# Patient Record
Sex: MALE | URBAN - NONMETROPOLITAN AREA
[De-identification: names, ages, dates, MRNs, and addresses within clinical notes are randomized per-mention and may not be internally consistent; named-entity substitution may affect disease eponyms.]

---

## 2018-10-08 ENCOUNTER — HOSPITAL ENCOUNTER (OUTPATIENT)
Dept: WOUND CARE | Age: 58
Discharge: HOME | End: 2018-10-08
Payer: COMMERCIAL

## 2018-10-08 VITALS
WEIGHT: 285 LBS | DIASTOLIC BLOOD PRESSURE: 80 MMHG | SYSTOLIC BLOOD PRESSURE: 119 MMHG | RESPIRATION RATE: 16 BRPM | HEIGHT: 74 IN | BODY MASS INDEX: 36.57 KG/M2 | HEART RATE: 75 BPM | TEMPERATURE: 96.8 F

## 2018-10-08 DIAGNOSIS — L97.512 RIGHT FOOT ULCER, WITH FAT LAYER EXPOSED (HCC): ICD-10-CM

## 2018-10-08 DIAGNOSIS — L97.412 DIABETIC ULCER OF RIGHT MIDFOOT ASSOCIATED WITH TYPE 2 DIABETES MELLITUS, WITH FAT LAYER EXPOSED (HCC): ICD-10-CM

## 2018-10-08 DIAGNOSIS — E11.621 DIABETIC ULCER OF RIGHT MIDFOOT ASSOCIATED WITH TYPE 2 DIABETES MELLITUS, WITH FAT LAYER EXPOSED (HCC): ICD-10-CM

## 2018-10-08 PROCEDURE — 87186 SC STD MICRODIL/AGAR DIL: CPT

## 2018-10-08 PROCEDURE — 29445 APPL RIGID TOT CNTC LEG CAST: CPT

## 2018-10-08 PROCEDURE — 87071 CULTURE AEROBIC QUANT OTHER: CPT

## 2018-10-08 PROCEDURE — 99203 OFFICE O/P NEW LOW 30 MIN: CPT

## 2018-10-08 PROCEDURE — 87073 CULTURE BACTERIA ANAEROBIC: CPT

## 2018-10-11 ENCOUNTER — HOSPITAL ENCOUNTER (OUTPATIENT)
Dept: WOUND CARE | Age: 58
Discharge: HOME | End: 2018-10-11
Payer: COMMERCIAL

## 2018-10-11 VITALS
TEMPERATURE: 97 F | SYSTOLIC BLOOD PRESSURE: 117 MMHG | HEART RATE: 83 BPM | DIASTOLIC BLOOD PRESSURE: 80 MMHG | RESPIRATION RATE: 16 BRPM

## 2018-10-11 DIAGNOSIS — E11.621 DIABETIC ULCER OF RIGHT MIDFOOT ASSOCIATED WITH TYPE 2 DIABETES MELLITUS, WITH FAT LAYER EXPOSED (HCC): Primary | ICD-10-CM

## 2018-10-11 DIAGNOSIS — L97.512 RIGHT FOOT ULCER, WITH FAT LAYER EXPOSED (HCC): ICD-10-CM

## 2018-10-11 DIAGNOSIS — L97.412 DIABETIC ULCER OF RIGHT MIDFOOT ASSOCIATED WITH TYPE 2 DIABETES MELLITUS, WITH FAT LAYER EXPOSED (HCC): Primary | ICD-10-CM

## 2018-10-11 PROCEDURE — 29445 APPL RIGID TOT CNTC LEG CAST: CPT

## 2018-10-11 NOTE — PROGRESS NOTES
care with: n/a              Your wound-care supplies will be provided by: .kati ROBLEDO provider:              Compression with              Off loading:  Date               Wound Meds:              Wound cleansing:                           Do not scrub or use excessive force. Wash hands with soap and water before and after dressing changes. Prior to applying a clean dressing, cleanse wound with normal saline,                          wound cleanser, or mild soap and water. Ask your physician or nurse before getting the wound(s) wet in the shower. Daily Wound management:                          Keep weight off wounds and reposition every 2 hours. Avoid standing for long periods of time. Apply wraps/stockings in AM and remove at bedtime. Elevate legs to the level of the heart or above for 30 minutes 4-5 times a day and/or when sitting. When taking antibiotics take entire prescription as ordered by MD do not stop taking until medicine is all gone.                                                                 Orders for this week: 10/11/18              . [employer]:       If you have questions or need to reorder your supplies please contact our [employer] representative Nitza at [phone].       Right lateral planter- wash with soap and water daily. Pat dry. Apply damp fibracol to wound bed, cover with silver alginate, fluffed 4x4s, border gauze. TCC to right leg.      Culture obtained 10/8/18     Return on Monday     Follow up with Dr. REYNA [facility]  in the wound care center     Call [phone] for any questions or concerns.   Date__________   Time____________              Treatment Note      Written Patient Dismissal Instructions Given            Electronically signed by Fadi Myles MD on 10/11/2018 at 9:25 AM

## 2018-10-12 LAB
CULTURE: NORMAL
Lab: NORMAL
ORGANISM: NORMAL
REPORT STATUS: NORMAL
SPECIMEN: NORMAL

## 2018-10-15 ENCOUNTER — HOSPITAL ENCOUNTER (OUTPATIENT)
Dept: WOUND CARE | Age: 58
Discharge: HOME | End: 2018-10-15
Payer: COMMERCIAL

## 2018-10-15 VITALS
HEART RATE: 71 BPM | RESPIRATION RATE: 16 BRPM | TEMPERATURE: 98.5 F | SYSTOLIC BLOOD PRESSURE: 136 MMHG | DIASTOLIC BLOOD PRESSURE: 82 MMHG

## 2018-10-15 DIAGNOSIS — L97.412 DIABETIC ULCER OF RIGHT MIDFOOT ASSOCIATED WITH TYPE 2 DIABETES MELLITUS, WITH FAT LAYER EXPOSED (HCC): Primary | ICD-10-CM

## 2018-10-15 DIAGNOSIS — E11.621 DIABETIC ULCER OF RIGHT MIDFOOT ASSOCIATED WITH TYPE 2 DIABETES MELLITUS, WITH FAT LAYER EXPOSED (HCC): Primary | ICD-10-CM

## 2018-10-15 PROCEDURE — 11042 DBRDMT SUBQ TIS 1ST 20SQCM/<: CPT

## 2018-10-15 NOTE — PROGRESS NOTES
is in no acute distress. Mental status:  Patient is appropriate, is  oriented to place and plan of care. Dermatologic exam: Visual inspection of the periwound reveals the skin to be macerated. Wound exam: see wound description below in procedure note      Assessment:     Problem List Items Addressed This Visit     Diabetic ulcer of right midfoot associated with type 2 diabetes mellitus, with fat layer exposed ([address] - Primary        Procedure Note    Indications:  Based on my examination of this patient's wound(s) today, sharp excision into necrotic subcutaneous tissue is required to promote healing and evaluate the extent of previous healing. Performed by: Evelina Carlson MD    Consent obtained: Yes    Time out taken:  Yes    Pain Control: none needed       Debridement:Excisional Debridement    Using #15 blade scalpel the wound(s) was/were sharply debrided down through and including the removal of subcutaneous tissue. Devitalized Tissue Debrided:  biofilm, slough and exudate    Pre Debridement Measurements:  Are located in the Wound Documentation Flow Sheet    All active wounds listed below with today's date are evaluated  Wound(s)    debrided this date include # : 1     Post  Debridement Measurements:  Wound 10/08/18 #1 RT LATERAL FOOT (ONSET 10 WEEKS) DM morejon 2 (Active)   Wound Image   10/15/2018  8:11 AM   Wound Type Wound 10/15/2018  8:11 AM   Wound Diabetic Morejon 2 10/15/2018  8:11 AM   Dressing Status Clean;Dry; Intact 10/8/2018 12:28 PM   Dressing Changed Changed/New 10/8/2018 12:28 PM   Wound Cleansed Wound cleanser 10/15/2018  8:11 AM   Wound Length (cm) 1.8 cm 10/15/2018  8:57 AM   Wound Width (cm) 2.9 cm 10/15/2018  8:57 AM   Wound Depth (cm)  0.1 10/15/2018  8:57 AM   Calculated Wound Size (cm^2) (l*w) 5.22 cm^2 10/15/2018  8:57 AM   Change in Wound Size % (l*w) 10 10/15/2018  8:57 AM   Distance Tunneling (cm) 0 cm 10/15/2018  8:11 AM   Tunneling Position ___ O'Clock 0 10/15/2018                        Consults:   Date                           PCP: Dr. Lilly Paula wound care orders and information:              Residence: home              Continue home health care with: n/a              Your wound-care supplies will be provided by: Vivian ROBLEDO provider:              Compression with              PSV loading:  Date               CTUHO Meds:              BBIBI cleansing:                           ZD not scrub or use excessive force.                          Wash hands with soap and water before and after dressing changes.                         Prior to applying a clean dressing, cleanse wound with normal saline,                          wound cleanser, or mild soap and water.                           Ask your physician or nurse before getting the wound(s) wet in the shower.              Daily Wound management:                          Keep weight off wounds and reposition every 2 hours.                          Avoid standing for long periods of time.                          Apply wraps/stockings in AM and remove at bedtime.                          Elevate legs to the level of the heart or above for 30 minutes 4-5 times a day and/or when sitting.                                               When taking antibiotics take entire prescription as ordered by MD do not stop taking until medicine is all gone.                                                                 Orders for this week: 10/15/18              . [employer]:       If you have questions or need to reorder your supplies please contact our "[employer] representative Nitza at [phone].       Right lateral planter- wash with soap and water daily. Pat dry. Apply silver nitrate moist 4x4's to wound bed,  fluffed 4x4s, conform and tape. Leave in place until Tuesday then apply damp sorbact to wound bed, cover with fibracol, silver alginate. Wrap with conform and tape. Change daily.   Wear walking boot

## 2018-10-18 ENCOUNTER — HOSPITAL ENCOUNTER (OUTPATIENT)
Dept: WOUND CARE | Age: 58
Discharge: HOME | End: 2018-10-18

## 2018-10-22 ENCOUNTER — HOSPITAL ENCOUNTER (OUTPATIENT)
Dept: WOUND CARE | Age: 58
Discharge: HOME | End: 2018-10-22
Payer: COMMERCIAL

## 2018-10-22 VITALS
DIASTOLIC BLOOD PRESSURE: 75 MMHG | HEART RATE: 77 BPM | SYSTOLIC BLOOD PRESSURE: 113 MMHG | TEMPERATURE: 96.5 F | RESPIRATION RATE: 16 BRPM

## 2018-10-22 DIAGNOSIS — L97.512 RIGHT FOOT ULCER, WITH FAT LAYER EXPOSED (HCC): Primary | ICD-10-CM

## 2018-10-22 DIAGNOSIS — E11.621 DIABETIC ULCER OF RIGHT MIDFOOT ASSOCIATED WITH TYPE 2 DIABETES MELLITUS, WITH FAT LAYER EXPOSED (HCC): ICD-10-CM

## 2018-10-22 DIAGNOSIS — L97.412 DIABETIC ULCER OF RIGHT MIDFOOT ASSOCIATED WITH TYPE 2 DIABETES MELLITUS, WITH FAT LAYER EXPOSED (HCC): ICD-10-CM

## 2018-10-22 PROCEDURE — 11042 DBRDMT SUBQ TIS 1ST 20SQCM/<: CPT

## 2018-10-22 ASSESSMENT — PAIN DESCRIPTION - LOCATION: LOCATION: FOOT

## 2018-10-22 ASSESSMENT — PAIN SCALES - GENERAL: PAINLEVEL_OUTOF10: 7

## 2018-10-22 ASSESSMENT — PAIN DESCRIPTION - ORIENTATION: ORIENTATION: RIGHT

## 2018-10-22 ASSESSMENT — PAIN DESCRIPTION - FREQUENCY: FREQUENCY: INTERMITTENT

## 2018-10-22 ASSESSMENT — PAIN DESCRIPTION - DESCRIPTORS: DESCRIPTORS: TENDER

## 2018-10-22 ASSESSMENT — PAIN DESCRIPTION - PROGRESSION: CLINICAL_PROGRESSION: NOT CHANGED

## 2018-10-22 ASSESSMENT — PAIN DESCRIPTION - PAIN TYPE: TYPE: ACUTE PAIN

## 2018-10-22 NOTE — PROGRESS NOTES
Wound Care Center Progress Note with Procedure Note      Ambrocio Last  AGE: 66 y.o. GENDER: male  : 1960  EPISODE DATE:  10/22/2018     Subjective:     Chief Complaint   Patient presents with    Wound Check     right foot         HISTORY of PRESENT ILLNESS      Diana Harmon is a 66 y.o. male who presents today for wound evaluation of Chronic diabetic wound(s) of Rt. foot lateral.  The wound is of moderate severity. The underlying cause of the wound is DM and pressure. Wound Pain Timing/Severity: none  Quality of pain: N/A  Severity of pain:  0 / 10   Modifying Factors: diabetes and chronic pressure  Associated Signs/Symptoms: none        PAST MEDICAL HISTORY        Diagnosis Date    Diabetes mellitus ([address]     Diabetic ulcer of right midfoot associated with type 2 diabetes mellitus, with fat layer exposed ([address] 10/8/2018    Right foot ulcer, with fat layer exposed ([facility] 10/8/2018       PAST SURGICAL HISTORY    Past Surgical History:   Procedure Laterality Date    FRACTURE SURGERY      left toe and right ankle and right knee       FAMILY HISTORY    History reviewed. No pertinent family history. SOCIAL HISTORY    Social History   Substance Use Topics    Smoking status: Never Smoker    Smokeless tobacco: Never Used    Alcohol use No       ALLERGIES    No Known Allergies    MEDICATIONS    No current outpatient prescriptions on file prior to encounter. No current facility-administered medications on file prior to encounter. REVIEW OF SYSTEMS    Pertinent items are noted in HPI. Constitutional: Negative for systemic symptoms including fever, chills and malaise. Objective:      /75   Pulse 77   Temp 96.5 °F (35.8 °C) (Oral)   Resp 16     PHYSICAL EXAM    General: The patient is in no acute distress. Mental status:  Patient is appropriate, is  oriented to place and plan of care.   Dermatologic exam: Visual inspection of the

## 2018-10-29 ENCOUNTER — HOSPITAL ENCOUNTER (OUTPATIENT)
Dept: WOUND CARE | Age: 58
Discharge: HOME | End: 2018-10-29
Payer: COMMERCIAL

## 2018-10-29 VITALS
DIASTOLIC BLOOD PRESSURE: 81 MMHG | RESPIRATION RATE: 16 BRPM | TEMPERATURE: 97.8 F | HEART RATE: 75 BPM | SYSTOLIC BLOOD PRESSURE: 128 MMHG

## 2018-10-29 DIAGNOSIS — L97.412 DIABETIC ULCER OF RIGHT MIDFOOT ASSOCIATED WITH TYPE 2 DIABETES MELLITUS, WITH FAT LAYER EXPOSED (HCC): ICD-10-CM

## 2018-10-29 DIAGNOSIS — L97.512 RIGHT FOOT ULCER, WITH FAT LAYER EXPOSED (HCC): Primary | ICD-10-CM

## 2018-10-29 DIAGNOSIS — E11.621 DIABETIC ULCER OF RIGHT MIDFOOT ASSOCIATED WITH TYPE 2 DIABETES MELLITUS, WITH FAT LAYER EXPOSED (HCC): ICD-10-CM

## 2018-10-29 PROCEDURE — 29445 APPL RIGID TOT CNTC LEG CAST: CPT

## 2018-10-29 PROCEDURE — 11042 DBRDMT SUBQ TIS 1ST 20SQCM/<: CPT | Performed by: NURSE PRACTITIONER

## 2018-10-29 RX ORDER — GABAPENTIN 400 MG/1
800 CAPSULE ORAL DAILY
COMMUNITY

## 2018-10-29 RX ORDER — ENALAPRIL MALEATE 10 MG/1
10 TABLET ORAL DAILY
Status: ON HOLD | COMMUNITY
End: 2019-03-18 | Stop reason: HOSPADM

## 2018-10-29 RX ORDER — SIMVASTATIN 40 MG
40 TABLET ORAL NIGHTLY
COMMUNITY
End: 2019-03-15 | Stop reason: ALTCHOICE

## 2018-10-29 RX ORDER — M-VIT,TX,IRON,MINS/CALC/FOLIC 27MG-0.4MG
1 TABLET ORAL DAILY
COMMUNITY

## 2018-10-29 ASSESSMENT — PAIN DESCRIPTION - FREQUENCY: FREQUENCY: INTERMITTENT

## 2018-10-29 ASSESSMENT — PAIN DESCRIPTION - ORIENTATION: ORIENTATION: RIGHT

## 2018-10-29 ASSESSMENT — PAIN DESCRIPTION - DESCRIPTORS: DESCRIPTORS: TENDER

## 2018-10-29 ASSESSMENT — PAIN DESCRIPTION - PAIN TYPE: TYPE: ACUTE PAIN

## 2018-10-29 ASSESSMENT — PAIN SCALES - GENERAL: PAINLEVEL_OUTOF10: 6

## 2018-10-29 ASSESSMENT — PAIN DESCRIPTION - PROGRESSION: CLINICAL_PROGRESSION: NOT CHANGED

## 2018-10-29 ASSESSMENT — PAIN DESCRIPTION - LOCATION: LOCATION: FOOT

## 2018-10-29 NOTE — PROGRESS NOTES
10/29/2018  8:38 AM   Undermining Ends___ O'Clock 0 10/29/2018  8:38 AM   Undermining Maxium Distance (cm) 0 10/15/2018  8:11 AM   Wound Assessment Red;Pink 10/29/2018  8:38 AM   Drainage Amount Moderate 10/29/2018  8:38 AM   Drainage Description Serosanguinous 10/29/2018  8:38 AM   Odor None 10/29/2018  8:38 AM   Margins Defined edges 10/29/2018  8:38 AM   Cathie-wound Assessment Calloused; Maceration 10/29/2018  8:38 AM   Non-staged Wound Description Full thickness 10/29/2018  8:38 AM   [place]%Wound Bed 20 10/29/2018  8:38 AM   Red%Wound Bed 80 10/29/2018  8:38 AM   Yellow%Wound Bed 0 10/29/2018  8:38 AM   Black%Wound Bed 0 10/29/2018  8:38 AM   Purple%Wound Bed 0 10/29/2018  8:38 AM   Other%Wound Bed 0 10/29/2018  8:38 AM   Debridement per physician Subcutaneous 10/22/2018  9:00 AM   Number of days: 21       Percent of Wound(s) Debrided: approximately 100%    Total  Area  Debrided[de-identified]  4.75 sq cm       Bleeding:  Minimal    Hemostasis Achieved:  by pressure    Procedural Pain:  0  / 10     Post Procedural Pain:  0 / 10     Application of cast:    With the indications of casting being present and no contraindications, information and  instructions were given to the patient and the patient consented to the treatment. After proper wound care and appropriate padding. A total contact cast was applied according to protocol. Fall prevention safety and instructions on using various supportive walking devices were discussed with the patient. The patient was also instructed on what to do and how to get help if the cast became uncomfortable. The patient expressed understanding of all of these issues. The patient tolerated the procedure well. Status of wound progress and description from last visit:   Slightly smaller in size.       Plan:       Discharge Instructions         Plan:         Discharge Instructions        .PHYSICIAN ORDERS AND DISCHARGE INSTRUCTIONS     NOTE: Upon discharge from the [address], you will receive a patient experience survey. We would be grateful if you would take the time to fill this survey out.     Wound care order history:                 VIVI's   Right   0.93    Left 0.93            Date 10/8/18              Vascular studies:   Date               Imaging:   Date               Cultures:   Date               Labs/ HbA1c:   Date               Grafts:  Date               HBO:                Antibiotics: gentamycin              Earlier Wound care treatments:  Gent, salt and vinager soaks              Authorizations:                        Consults:   Date                           PCP: Dr. Amy Hanson wound care orders and information:              Residence: home              Continue home health care with: n/a              Your wound-care supplies will be provided by: Aman Tejada               DME provider:              Compression with              NST loading:  Date               CMBOX Meds:              [facility] cleansing:                           HU not scrub or use excessive force.                          Wash hands with soap and water before and after dressing changes.                         Prior to applying a clean dressing, cleanse wound with normal saline,                          wound cleanser, or mild soap and water.                           Ask your physician or nurse before getting the wound(s) wet in the shower.              Daily Wound management:                          Keep weight off wounds and reposition every 2 hours.                          Avoid standing for long periods of time.                          Apply wraps/stockings in AM and remove at bedtime.                          Elevate legs to the level of the heart or above for 30 minutes 4-5 times a day and/or when sitting.                                               When taking antibiotics take entire prescription as ordered by MD do not stop taking until medicine is all gone.

## 2018-11-01 ENCOUNTER — HOSPITAL ENCOUNTER (OUTPATIENT)
Dept: WOUND CARE | Age: 58
Discharge: HOME | End: 2018-11-01
Payer: COMMERCIAL

## 2018-11-01 VITALS
TEMPERATURE: 97.6 F | SYSTOLIC BLOOD PRESSURE: 131 MMHG | RESPIRATION RATE: 16 BRPM | DIASTOLIC BLOOD PRESSURE: 84 MMHG | HEART RATE: 75 BPM

## 2018-11-01 DIAGNOSIS — L97.412 DIABETIC ULCER OF RIGHT MIDFOOT ASSOCIATED WITH TYPE 2 DIABETES MELLITUS, WITH FAT LAYER EXPOSED (HCC): Primary | ICD-10-CM

## 2018-11-01 DIAGNOSIS — E11.621 DIABETIC ULCER OF RIGHT MIDFOOT ASSOCIATED WITH TYPE 2 DIABETES MELLITUS, WITH FAT LAYER EXPOSED (HCC): Primary | ICD-10-CM

## 2018-11-01 DIAGNOSIS — L97.512 RIGHT FOOT ULCER, WITH FAT LAYER EXPOSED (HCC): ICD-10-CM

## 2018-11-01 PROCEDURE — 87186 SC STD MICRODIL/AGAR DIL: CPT

## 2018-11-01 PROCEDURE — 29445 APPL RIGID TOT CNTC LEG CAST: CPT

## 2018-11-01 PROCEDURE — 11042 DBRDMT SUBQ TIS 1ST 20SQCM/<: CPT

## 2018-11-01 PROCEDURE — 87073 CULTURE BACTERIA ANAEROBIC: CPT

## 2018-11-01 PROCEDURE — 87077 CULTURE AEROBIC IDENTIFY: CPT

## 2018-11-01 PROCEDURE — 87071 CULTURE AEROBIC QUANT OTHER: CPT

## 2018-11-01 NOTE — PROGRESS NOTES
Medication Sig Dispense Refill    metFORMIN (GLUCOPHAGE) 850 MG tablet Take 850 mg by mouth 2 times daily (with meals)      simvastatin (ZOCOR) 40 MG tablet Take 40 mg by mouth nightly      Multiple Vitamins-Minerals (THERAPEUTIC MULTIVITAMIN-MINERALS) tablet Take 1 tablet by mouth daily      gabapentin (NEURONTIN) 400 MG capsule Take 800 mg by mouth every 12 hours as needed. .      enalapril (VASOTEC) 10 MG tablet Take 10 mg by mouth daily       No current facility-administered medications on file prior to encounter. REVIEW OF SYSTEMS    Pertinent items are noted in HPI. Constitutional: Negative for systemic symptoms including fever, chills and malaise. Objective:      /84   Pulse 75   Temp 97.6 °F (36.4 °C) (Temporal)   Resp 16     PHYSICAL EXAM      General: The patient is in no acute distress. Mental status:  Patient is appropriate, is  oriented to place and plan of care. Dermatologic exam: Visual inspection of the periwound reveals the skin to be moist and macerated  Wound exam: see wound description below in procedure note      Assessment:     Problem List Items Addressed This Visit     Right foot ulcer, with fat layer exposed ([address]    Diabetic ulcer of right midfoot associated with type 2 diabetes mellitus, with fat layer exposed ([address] - Primary        Procedure Note    Indications:  Based on my examination of this patient's wound(s) today, sharp excision into necrotic subcutaneous tissue is required to promote healing and evaluate the extent of previous healing. Performed by: Rachel Monterroso MD    Consent obtained: Yes    Time out taken:  Yes    Pain Control: none       Debridement:Excisional Debridement    Using curette the wound(s) was/were sharply debrided down through and including the removal of subcutaneous tissue.         Devitalized Tissue Debrided:  fibrin, biofilm, slough, necrotic/eschar, exudate and callus    Pre Debridement Measurements:  Are located in the Wound Documentation Flow Sheet    All active wounds listed below with today's date are evaluated  Wound(s)    debrided this date include # : 1     Post  Debridement Measurements:  Wound 10/08/18 #1 (onset 10 weeks - DM Wag 2 )Right Lateral Foot  (Active)   Wound Image   11/1/2018  7:56 AM   Wound Type Wound 11/1/2018  7:56 AM   Wound Diabetic Morejon 2 11/1/2018  7:56 AM   Dressing Status Clean;Dry; Intact 11/1/2018  8:43 AM   Dressing Changed Changed/New 11/1/2018  8:43 AM   Dressing/Treatment Alginate; Foam 11/1/2018  8:43 AM   Wound Cleansed Wound cleanser 11/1/2018  7:56 AM   Dressing Change Due 01/06/18 10/22/2018  8:45 AM   Wound Length (cm) 2 cm 11/1/2018  8:35 AM   Wound Width (cm) 2.3 cm 11/1/2018  8:35 AM   Wound Depth (cm)  0.1 11/1/2018  8:35 AM   Calculated Wound Size (cm^2) (l*w) 4.6 cm^2 11/1/2018  8:35 AM   Change in Wound Size % (l*w) 20.69 11/1/2018  8:35 AM   Distance Tunneling (cm) 0 cm 11/1/2018  7:56 AM   Tunneling Position ___ O'Clock 0 11/1/2018  7:56 AM   Undermining Starts ___ O'Clock 0 11/1/2018  7:56 AM   Undermining Ends___ O'Clock 0 11/1/2018  7:56 AM   Undermining Maxium Distance (cm) 0 11/1/2018  7:56 AM   Wound Assessment Pink;Red 11/1/2018  7:56 AM   Drainage Amount Moderate 11/1/2018  7:56 AM   Drainage Description Serosanguinous 11/1/2018  7:56 AM   Odor None 11/1/2018  7:56 AM   Margins Defined edges 11/1/2018  7:56 AM   Cathie-wound Assessment Maceration 11/1/2018  7:56 AM   Non-staged Wound Description Full thickness 11/1/2018  7:56 AM   [place]%Wound Bed 50 11/1/2018  7:56 AM   Red%Wound Bed 50 11/1/2018  7:56 AM   Yellow%Wound Bed 0 11/1/2018  7:56 AM   Black%Wound Bed 0 11/1/2018  7:56 AM   Purple%Wound Bed 0 11/1/2018  7:56 AM   Other%Wound Bed 0 11/1/2018  7:56 AM   Debridement per physician Subcutaneous 10/22/2018  9:00 AM   Number of days: 24       Percent of Wound(s) Debrided: approximately 100%    Total  Area  Debrided:  4.6 sq cm     Bleeding:  Estimated amount of blood loss is 2 ml. Hemostasis Achieved:  by pressure and by silver nitrate stick    Procedural Pain:  0  / 10     Post Procedural Pain:  0 / 10     Response to treatment:  Well tolerated by patient. Status of wound progress and description from last visit:   Wound is marginally better. After we let his foot air dry, the maceration is gone. I apply a layer of betadine to the foot and to the wound. He still MUST have the off-loading, so he needs to have the TCC back on. I place this today. He will get cast change on Monday. Plan:       Discharge Instructions         Discharge Instructions        .PHYSICIAN ORDERS AND DISCHARGE INSTRUCTIONS     NOTE: Upon discharge from the [address], you will receive a patient experience survey. We would be grateful if you would take the time to fill this survey out.     Wound care order history:                 VIVI's   Right   0.93    Left 0.93            Date 10/8/18              Vascular studies:   Date               Imaging:   Date               Cultures:   Date               Labs/ HbA1c:   Date               Grafts:  Date               HBO:                Antibiotics: gentamycin              Earlier Wound care treatments:  Gent, salt and vinager soaks              Authorizations:                        Consults:   Date                           PCP: Dr. Efra Arteaga wound care orders and information:              Residence: home              Continue home health care with: n/a              Your wound-care supplies will be provided by: Massiel ROBLEDO provider:              Compression with              VMC loading:  Date               GYZCZ Meds:              YYPKK cleansing:                           YX not scrub or use excessive force.                          Wash hands with soap and water before and after dressing changes.                           Prior to applying a clean dressing, cleanse wound with normal

## 2018-11-05 ENCOUNTER — HOSPITAL ENCOUNTER (OUTPATIENT)
Dept: WOUND CARE | Age: 58
Discharge: HOME | End: 2018-11-05
Payer: COMMERCIAL

## 2018-11-05 VITALS
DIASTOLIC BLOOD PRESSURE: 75 MMHG | SYSTOLIC BLOOD PRESSURE: 115 MMHG | TEMPERATURE: 98.6 F | RESPIRATION RATE: 16 BRPM | HEART RATE: 74 BPM

## 2018-11-05 DIAGNOSIS — L97.512 RIGHT FOOT ULCER, WITH FAT LAYER EXPOSED (HCC): ICD-10-CM

## 2018-11-05 DIAGNOSIS — L97.412 DIABETIC ULCER OF RIGHT MIDFOOT ASSOCIATED WITH TYPE 2 DIABETES MELLITUS, WITH FAT LAYER EXPOSED (HCC): ICD-10-CM

## 2018-11-05 DIAGNOSIS — E11.621 DIABETIC ULCER OF RIGHT MIDFOOT ASSOCIATED WITH TYPE 2 DIABETES MELLITUS, WITH FAT LAYER EXPOSED (HCC): ICD-10-CM

## 2018-11-05 LAB
CULTURE: NORMAL
Lab: NORMAL
ORGANISM: NORMAL
ORGANISM: NORMAL
REPORT STATUS: NORMAL
SPECIMEN: NORMAL

## 2018-11-05 PROCEDURE — 29445 APPL RIGID TOT CNTC LEG CAST: CPT

## 2018-11-05 RX ORDER — SULFAMETHOXAZOLE AND TRIMETHOPRIM 800; 160 MG/1; MG/1
1 TABLET ORAL 2 TIMES DAILY
Qty: 28 TABLET | Refills: 0 | Status: SHIPPED | OUTPATIENT
Start: 2018-11-05 | End: 2018-11-19

## 2018-11-05 ASSESSMENT — PAIN DESCRIPTION - LOCATION: LOCATION: FOOT

## 2018-11-05 ASSESSMENT — PAIN DESCRIPTION - PAIN TYPE: TYPE: CHRONIC PAIN

## 2018-11-05 ASSESSMENT — PAIN DESCRIPTION - PROGRESSION: CLINICAL_PROGRESSION: NOT CHANGED

## 2018-11-05 ASSESSMENT — PAIN DESCRIPTION - DESCRIPTORS: DESCRIPTORS: TINGLING

## 2018-11-05 ASSESSMENT — PAIN SCALES - GENERAL: PAINLEVEL_OUTOF10: 6

## 2018-11-05 ASSESSMENT — PAIN DESCRIPTION - ONSET: ONSET: ON-GOING

## 2018-11-05 ASSESSMENT — PAIN DESCRIPTION - FREQUENCY: FREQUENCY: INTERMITTENT

## 2018-11-05 ASSESSMENT — PAIN DESCRIPTION - ORIENTATION: ORIENTATION: RIGHT

## 2018-11-05 NOTE — PROGRESS NOTES
Ends___ O'Clock 0 11/5/2018  9:05 AM   Undermining Maxium Distance (cm) 0 11/5/2018  9:05 AM   Wound Assessment [place] 11/5/2018  9:05 AM   Drainage Amount Moderate 11/5/2018  9:05 AM   Drainage Description Serosanguinous 11/5/2018  9:05 AM   Odor None 11/5/2018  9:05 AM   Margins Defined edges; Unattached edges 11/5/2018  9:05 AM   Cathie-wound Assessment Calloused; Maceration;Pink; White 11/5/2018  9:05 AM   Non-staged Wound Description Full thickness 11/5/2018  9:05 AM   [place]%Wound Bed 100 11/5/2018  9:05 AM   Red%Wound Bed 0 11/5/2018  9:05 AM   Yellow%Wound Bed 0 11/5/2018  9:05 AM   Black%Wound Bed 0 11/5/2018  9:05 AM   Purple%Wound Bed 0 11/5/2018  9:05 AM   Other%Wound Bed 0 11/5/2018  9:05 AM   Debridement per physician Subcutaneous 11/5/2018  9:47 AM   Number of days: 28         Application of cast:    With the indications of casting being present and no contraindications, information and  instructions were given to the patient and the patient consented to the treatment. After proper wound care and appropriate padding. A total contact cast was applied according to protocol. Fall prevention safety and instructions on using various supportive walking devices were discussed with the patient. The patient was also instructed on what to do and how to get help if the cast became uncomfortable. The patient expressed understanding of all of these issues. The patient tolerated the procedure well. Status of wound progress and description from last visit:   improved. Plan:       Discharge Instructions         Plan:         Discharge Instructions           Discharge Instructions        .PHYSICIAN ORDERS AND DISCHARGE INSTRUCTIONS     NOTE: Upon discharge from the [address], you will receive a patient experience survey.  We would be grateful if you would take the time to fill this survey out.     Wound care order history:                 VIVI's   Right   0.93    Left 0.93            Date 10/8/18              Vascular studies:   Date               Imaging:   Date               Cultures:   Date               Labs/ HbA1c:   Date               Grafts:  Date               HBO:                Antibiotics: gentamycin              Earlier Wound care treatments:  Gent, salt and vinager soaks              Authorizations:                        Consults:   Date                           PCP: Dr. Vadlez Been wound care orders and information:              Residence: home              Continue home health care with: n/a              Your wound-care supplies will be provided by: Sky Hernandez               DME provider:              Compression with              QEG loading:  Date               NWCGB Meds:              VAGZL cleansing:                           RL not scrub or use excessive force.                          Wash hands with soap and water before and after dressing changes.                           Prior to applying a clean dressing, cleanse wound with normal saline,                          wound cleanser, or mild soap and water.                           Ask your physician or nurse before getting the wound(s) wet in the shower.              Daily Wound management:                          Keep weight off wounds and reposition every 2 hours.                          SYXQJ standing for long periods of time.                          Apply wraps/stockings in AM and remove at bedtime.                          Elevate legs to the level of the heart or above for 30 minutes 4-5 times a day and/or when sitting.                                               When taking antibiotics take entire prescription as ordered by MD do not stop taking until medicine is all gone.                                                                 Orders for this week: 11/5/18              . [employer]:       If you have questions or need to reorder your supplies please contact our [employer] representative Nitza at

## 2018-11-12 ENCOUNTER — HOSPITAL ENCOUNTER (OUTPATIENT)
Dept: WOUND CARE | Age: 58
Discharge: HOME | End: 2018-11-12
Payer: COMMERCIAL

## 2018-11-12 VITALS
DIASTOLIC BLOOD PRESSURE: 84 MMHG | HEART RATE: 83 BPM | TEMPERATURE: 97.7 F | SYSTOLIC BLOOD PRESSURE: 121 MMHG | RESPIRATION RATE: 16 BRPM

## 2018-11-12 DIAGNOSIS — E11.621 DIABETIC ULCER OF RIGHT MIDFOOT ASSOCIATED WITH TYPE 2 DIABETES MELLITUS, WITH FAT LAYER EXPOSED (HCC): ICD-10-CM

## 2018-11-12 DIAGNOSIS — L97.512 RIGHT FOOT ULCER, WITH FAT LAYER EXPOSED (HCC): Primary | ICD-10-CM

## 2018-11-12 DIAGNOSIS — L97.412 DIABETIC ULCER OF RIGHT MIDFOOT ASSOCIATED WITH TYPE 2 DIABETES MELLITUS, WITH FAT LAYER EXPOSED (HCC): ICD-10-CM

## 2018-11-12 PROCEDURE — 11042 DBRDMT SUBQ TIS 1ST 20SQCM/<: CPT

## 2018-11-12 ASSESSMENT — PAIN DESCRIPTION - PROGRESSION: CLINICAL_PROGRESSION: NOT CHANGED

## 2018-11-12 NOTE — PROGRESS NOTES
as needed. .      enalapril (VASOTEC) 10 MG tablet Take 10 mg by mouth daily       No current facility-administered medications on file prior to encounter. REVIEW OF SYSTEMS    Pertinent items are noted in HPI. Constitutional: Negative for systemic symptoms including fever, chills and malaise. Objective:      /84   Pulse 83   Temp 97.7 °F (36.5 °C) (Temporal)   Resp 16     PHYSICAL EXAM    General: The patient is in no acute distress. Mental status:  Patient is appropriate, is  oriented to place and plan of care. Dermatologic exam: Visual inspection of the periwound reveals the skin to be edematous  Wound exam: see wound description below in procedure note      Assessment:     Problem List Items Addressed This Visit     Right foot ulcer, with fat layer exposed ([address] - Primary    Diabetic ulcer of right midfoot associated with type 2 diabetes mellitus, with fat layer exposed ([address]        Procedure Note    Indications:  Based on my examination of this patient's wound(s) today, sharp excision into necrotic subcutaneous tissue is required to promote healing and evaluate the extent of previous healing. Performed by: Diego Kumar DPM    Consent obtained: Yes    Time out taken:  Yes    Pain Control: none       Debridement:Excisional Debridement    Using curette the wound(s) was/were sharply debrided down through and including the removal of subcutaneous tissue. Devitalized Tissue Debrided:  biofilm, slough, exudate and callus    Pre Debridement Measurements:  Are located in the Wound Documentation Flow Sheet    All active wounds listed below with today's date are evaluated  Wound(s)    debrided this date include # : 1     Post  Debridement Measurements:  Wound 10/08/18 #1 (onset 10 weeks - DM Wag 2 )Right Lateral Foot  (Active)   Wound Image   11/1/2018  7:56 AM   Wound Type Wound 11/12/2018  9:00 AM   Wound Diabetic Morejon 2 11/12/2018  9:00 AM   Dressing Status Clean;Dry; Intact

## 2018-11-19 ENCOUNTER — HOSPITAL ENCOUNTER (OUTPATIENT)
Dept: WOUND CARE | Age: 58
Discharge: HOME | End: 2018-11-19
Payer: COMMERCIAL

## 2018-11-19 VITALS
RESPIRATION RATE: 18 BRPM | DIASTOLIC BLOOD PRESSURE: 77 MMHG | SYSTOLIC BLOOD PRESSURE: 139 MMHG | TEMPERATURE: 98.2 F | HEART RATE: 98 BPM

## 2018-11-19 DIAGNOSIS — L97.412 DIABETIC ULCER OF RIGHT MIDFOOT ASSOCIATED WITH TYPE 2 DIABETES MELLITUS, WITH FAT LAYER EXPOSED (HCC): ICD-10-CM

## 2018-11-19 DIAGNOSIS — E11.621 DIABETIC ULCER OF RIGHT MIDFOOT ASSOCIATED WITH TYPE 2 DIABETES MELLITUS, WITH FAT LAYER EXPOSED (HCC): ICD-10-CM

## 2018-11-19 DIAGNOSIS — L97.512 RIGHT FOOT ULCER, WITH FAT LAYER EXPOSED (HCC): Primary | ICD-10-CM

## 2018-11-19 PROCEDURE — 11042 DBRDMT SUBQ TIS 1ST 20SQCM/<: CPT

## 2018-11-19 NOTE — PROGRESS NOTES
discharge from the [address] [facility],[address], you will receive a patient experience survey. We would be grateful if you would take the time to fill this survey out.     Wound care order history:                 VIVI's   Right   0.93    Left 0.93            Date 10/8/18              Vascular studies:   Date               Imaging:   Date               Cultures:   Date               Labs/ HbA1c:   Date               Grafts:  Date               HBO:                Antibiotics: gentamycin. 10/8 bactrim               Earlier Wound care treatments:  Gent, salt and vinager soaks              Authorizations:                        Consults:   Date                           PCP: Dr. Ariana Hoffmann care orders and information:              Residence: home              Continue home health care with: n/a              Your wound-care supplies will be provided by: Doug ROBLEDO provider:              Compression with              BZF loading:  Date               QNUEZ Meds:              RGCDU cleansing:                           FS not scrub or use excessive force.                          Wash hands with soap and water before and after dressing changes.                           Prior to applying a clean dressing, cleanse wound with normal saline,                          wound cleanser, or mild soap and water.                           Ask your physician or nurse before getting the wound(s) wet in the shower.              Daily Wound management:                          Keep weight off wounds and reposition every 2 hours.                          Avoid standing for long periods of time.                          Apply wraps/stockings in AM and remove at bedtime.                          Elevate legs to the level of the heart or above for 30 minutes 4-5 times a day and/or when sitting.                                               When taking antibiotics take entire prescription as ordered by MD do not stop taking until

## 2018-11-26 ENCOUNTER — HOSPITAL ENCOUNTER (OUTPATIENT)
Dept: WOUND CARE | Age: 58
Discharge: HOME | End: 2018-11-26
Payer: COMMERCIAL

## 2018-11-26 VITALS
SYSTOLIC BLOOD PRESSURE: 132 MMHG | RESPIRATION RATE: 16 BRPM | HEART RATE: 76 BPM | TEMPERATURE: 98 F | DIASTOLIC BLOOD PRESSURE: 81 MMHG

## 2018-11-26 DIAGNOSIS — L97.412 DIABETIC ULCER OF RIGHT MIDFOOT ASSOCIATED WITH TYPE 2 DIABETES MELLITUS, WITH FAT LAYER EXPOSED (HCC): ICD-10-CM

## 2018-11-26 DIAGNOSIS — L97.512 RIGHT FOOT ULCER, WITH FAT LAYER EXPOSED (HCC): ICD-10-CM

## 2018-11-26 DIAGNOSIS — E11.621 DIABETIC ULCER OF RIGHT MIDFOOT ASSOCIATED WITH TYPE 2 DIABETES MELLITUS, WITH FAT LAYER EXPOSED (HCC): ICD-10-CM

## 2018-11-26 PROCEDURE — 11042 DBRDMT SUBQ TIS 1ST 20SQCM/<: CPT

## 2018-11-26 NOTE — PROGRESS NOTES
prior to encounter. REVIEW OF SYSTEMS    Pertinent items are noted in HPI. Constitutional: Negative for systemic symptoms including fever, chills and malaise. Objective:      /81   Pulse 76   Temp 98 °F (36.7 °C) (Oral)   Resp 16     PHYSICAL EXAM    General: The patient is in no acute distress. Mental status:  Patient is appropriate, is  oriented to place and plan of care. Dermatologic exam: Visual inspection of the periwound reveals the skin to be normal in turgor and texture  Wound exam: see wound description below in procedure note      Assessment:     Problem List Items Addressed This Visit     Right foot ulcer, with fat layer exposed ([address]    Diabetic ulcer of right midfoot associated with type 2 diabetes mellitus, with fat layer exposed ([address]        Procedure Note    Indications:  Based on my examination of this patient's wound(s) today, sharp excision into necrotic subcutaneous tissue is required to promote healing and evaluate the extent of previous healing. Performed by: Nash Lopez DPM    Consent obtained: Yes    Time out taken:  Yes    Pain Control: none       Debridement:Excisional Debridement    Using #15 blade scalpel the wound(s) was/were sharply debrided down through and including the removal of subcutaneous tissue. Devitalized Tissue Debrided:  biofilm, slough, exudate and callus    Pre Debridement Measurements:  Are located in the Wound Documentation Flow Sheet    All active wounds listed below with today's date are evaluated  Wound(s)    debrided this date include # : 1     Post  Debridement Measurements:  Wound 10/08/18 #1 (onset 10 weeks - DM Wag 2)Right Lateral Foot  (Active)   Wound Image   11/1/2018  7:56 AM   Wound Type Wound 11/19/2018  8:23 AM   Wound Diabetic Morejon 2 11/19/2018  8:23 AM   Dressing Status Clean;Dry; Intact 11/19/2018  8:59 AM   Dressing Changed Changed/New 11/19/2018  8:59 AM   Dressing/Treatment [place];4x4 11/19/2018  8:59 AM

## 2018-12-03 ENCOUNTER — HOSPITAL ENCOUNTER (OUTPATIENT)
Dept: WOUND CARE | Age: 58
Discharge: HOME | End: 2018-12-03
Payer: COMMERCIAL

## 2018-12-03 VITALS
RESPIRATION RATE: 16 BRPM | TEMPERATURE: 98.4 F | DIASTOLIC BLOOD PRESSURE: 73 MMHG | HEART RATE: 78 BPM | SYSTOLIC BLOOD PRESSURE: 121 MMHG

## 2018-12-03 DIAGNOSIS — L97.512 RIGHT FOOT ULCER, WITH FAT LAYER EXPOSED (HCC): Primary | ICD-10-CM

## 2018-12-03 DIAGNOSIS — E11.621 DIABETIC ULCER OF RIGHT MIDFOOT ASSOCIATED WITH TYPE 2 DIABETES MELLITUS, WITH FAT LAYER EXPOSED (HCC): ICD-10-CM

## 2018-12-03 DIAGNOSIS — L97.412 DIABETIC ULCER OF RIGHT MIDFOOT ASSOCIATED WITH TYPE 2 DIABETES MELLITUS, WITH FAT LAYER EXPOSED (HCC): ICD-10-CM

## 2018-12-03 PROCEDURE — 11042 DBRDMT SUBQ TIS 1ST 20SQCM/<: CPT

## 2018-12-03 ASSESSMENT — PAIN DESCRIPTION - FREQUENCY: FREQUENCY: INTERMITTENT

## 2018-12-03 ASSESSMENT — PAIN SCALES - GENERAL: PAINLEVEL_OUTOF10: 6

## 2018-12-03 ASSESSMENT — PAIN DESCRIPTION - DESCRIPTORS: DESCRIPTORS: SORE;TINGLING

## 2018-12-03 ASSESSMENT — PAIN DESCRIPTION - ONSET: ONSET: ON-GOING

## 2018-12-03 ASSESSMENT — PAIN DESCRIPTION - PAIN TYPE: TYPE: CHRONIC PAIN

## 2018-12-03 ASSESSMENT — PAIN DESCRIPTION - ORIENTATION: ORIENTATION: RIGHT

## 2018-12-03 ASSESSMENT — PAIN DESCRIPTION - PROGRESSION: CLINICAL_PROGRESSION: NOT CHANGED

## 2018-12-03 ASSESSMENT — PAIN DESCRIPTION - LOCATION: LOCATION: FOOT

## 2018-12-10 ENCOUNTER — HOSPITAL ENCOUNTER (OUTPATIENT)
Dept: WOUND CARE | Age: 58
Discharge: HOME | End: 2018-12-10

## 2018-12-17 ENCOUNTER — HOSPITAL ENCOUNTER (OUTPATIENT)
Dept: WOUND CARE | Age: 58
Discharge: HOME | End: 2018-12-17
Payer: COMMERCIAL

## 2018-12-17 VITALS — SYSTOLIC BLOOD PRESSURE: 146 MMHG | DIASTOLIC BLOOD PRESSURE: 73 MMHG | TEMPERATURE: 97.8 F | RESPIRATION RATE: 18 BRPM

## 2018-12-17 DIAGNOSIS — L97.512 RIGHT FOOT ULCER, WITH FAT LAYER EXPOSED (HCC): Primary | ICD-10-CM

## 2018-12-17 DIAGNOSIS — E11.621 DIABETIC ULCER OF RIGHT MIDFOOT ASSOCIATED WITH TYPE 2 DIABETES MELLITUS, WITH FAT LAYER EXPOSED (HCC): ICD-10-CM

## 2018-12-17 DIAGNOSIS — L97.412 DIABETIC ULCER OF RIGHT MIDFOOT ASSOCIATED WITH TYPE 2 DIABETES MELLITUS, WITH FAT LAYER EXPOSED (HCC): ICD-10-CM

## 2018-12-17 PROCEDURE — 11042 DBRDMT SUBQ TIS 1ST 20SQCM/<: CPT

## 2018-12-17 ASSESSMENT — PAIN DESCRIPTION - PROGRESSION: CLINICAL_PROGRESSION: NOT CHANGED

## 2018-12-17 NOTE — PROGRESS NOTES
Cleansed Wound cleanser 12/17/2018  8:13 AM   Wound Length (cm) 0.7 cm 12/17/2018  8:13 AM   Wound Width (cm) 1.6 cm 12/17/2018  8:13 AM   Wound Depth (cm) 0.2 cm 12/17/2018  8:13 AM   Wound Surface Area (cm^2) 1.12 cm^2 12/17/2018  8:13 AM   Change in Wound Size % (l*w) 34.12 12/17/2018  8:13 AM   Wound Volume (cm^3) 0.22 cm^3 12/17/2018  8:13 AM   Wound Healing % -29 12/17/2018  8:13 AM   Post-Procedure Length (cm) 0.7 cm 12/17/2018  8:15 AM   Post-Procedure Width (cm) 1.6 cm 12/17/2018  8:15 AM   Post-Procedure Depth (cm) 0.2 cm 12/17/2018  8:15 AM   Post-Procedure Surface Area (cm^2) 1.12 cm^2 12/17/2018  8:15 AM   Post-Procedure Volume (cm^3) 0.22 cm^3 12/17/2018  8:15 AM   Distance Tunneling (cm) 0 cm 12/17/2018  8:13 AM   Tunneling Position ___ O'Clock 0 12/17/2018  8:13 AM   Undermining Starts ___ O'Clock 0 12/17/2018  8:13 AM   Undermining Ends___ O'Clock 0 12/17/2018  8:13 AM   Undermining Maxium Distance (cm) 0 12/17/2018  8:13 AM   Wound Assessment Pink 12/17/2018  8:13 AM   Drainage Amount Moderate 12/17/2018  8:13 AM   Drainage Description Serosanguinous 12/17/2018  8:13 AM   Odor None 12/17/2018  8:13 AM   Margins Defined edges 12/17/2018  8:13 AM   Cathie-wound Assessment Calloused 12/17/2018  8:13 AM   Non-staged Wound Description Full thickness 12/17/2018  8:13 AM   [place]%Wound Bed 100 12/17/2018  8:13 AM   Red%Wound Bed 0 12/17/2018  8:13 AM   Yellow%Wound Bed 0 12/17/2018  8:13 AM   Black%Wound Bed 0 12/17/2018  8:13 AM   Purple%Wound Bed 0 12/17/2018  8:13 AM   Other%Wound Bed 0 12/17/2018  8:13 AM   Number of days: 70       Percent of Wound(s) Debrided: approximately 100%    Total  Area  Debrided:  1.12 sq cm     Bleeding:  Minimal    Hemostasis Achieved:  by pressure    Procedural Pain:  0  / 10     Post Procedural Pain:  0 / 10     Response to treatment:  Well tolerated by patient. Status of wound progress and description from last visit:   Improved slightly.       Plan:       Discharge

## 2018-12-31 ENCOUNTER — HOSPITAL ENCOUNTER (OUTPATIENT)
Dept: WOUND CARE | Age: 58
Discharge: HOME | End: 2018-12-31

## 2019-01-07 ENCOUNTER — HOSPITAL ENCOUNTER (OUTPATIENT)
Dept: WOUND CARE | Age: 59
Discharge: HOME | End: 2019-01-07
Payer: COMMERCIAL

## 2019-01-07 VITALS
TEMPERATURE: 97.2 F | RESPIRATION RATE: 18 BRPM | DIASTOLIC BLOOD PRESSURE: 83 MMHG | HEART RATE: 98 BPM | SYSTOLIC BLOOD PRESSURE: 141 MMHG

## 2019-01-07 DIAGNOSIS — L97.512 RIGHT FOOT ULCER, WITH FAT LAYER EXPOSED (HCC): Primary | ICD-10-CM

## 2019-01-07 DIAGNOSIS — E11.621 DIABETIC ULCER OF RIGHT MIDFOOT ASSOCIATED WITH TYPE 2 DIABETES MELLITUS, WITH FAT LAYER EXPOSED (HCC): ICD-10-CM

## 2019-01-07 DIAGNOSIS — L97.412 DIABETIC ULCER OF RIGHT MIDFOOT ASSOCIATED WITH TYPE 2 DIABETES MELLITUS, WITH FAT LAYER EXPOSED (HCC): ICD-10-CM

## 2019-01-07 PROCEDURE — 11042 DBRDMT SUBQ TIS 1ST 20SQCM/<: CPT

## 2019-01-07 ASSESSMENT — PAIN DESCRIPTION - PROGRESSION: CLINICAL_PROGRESSION: NOT CHANGED

## 2019-01-14 ENCOUNTER — HOSPITAL ENCOUNTER (OUTPATIENT)
Dept: WOUND CARE | Age: 59
Discharge: HOME | End: 2019-01-14
Payer: COMMERCIAL

## 2019-01-14 VITALS
DIASTOLIC BLOOD PRESSURE: 79 MMHG | TEMPERATURE: 97.6 F | HEART RATE: 86 BPM | SYSTOLIC BLOOD PRESSURE: 133 MMHG | RESPIRATION RATE: 16 BRPM

## 2019-01-14 DIAGNOSIS — L97.512 RIGHT FOOT ULCER, WITH FAT LAYER EXPOSED (HCC): ICD-10-CM

## 2019-01-14 DIAGNOSIS — L97.412 DIABETIC ULCER OF RIGHT MIDFOOT ASSOCIATED WITH TYPE 2 DIABETES MELLITUS, WITH FAT LAYER EXPOSED (HCC): ICD-10-CM

## 2019-01-14 DIAGNOSIS — E11.621 DIABETIC ULCER OF RIGHT MIDFOOT ASSOCIATED WITH TYPE 2 DIABETES MELLITUS, WITH FAT LAYER EXPOSED (HCC): ICD-10-CM

## 2019-01-14 PROCEDURE — 11042 DBRDMT SUBQ TIS 1ST 20SQCM/<: CPT

## 2019-01-14 ASSESSMENT — PAIN SCALES - GENERAL: PAINLEVEL_OUTOF10: 5

## 2019-01-14 ASSESSMENT — PAIN DESCRIPTION - FREQUENCY: FREQUENCY: INTERMITTENT

## 2019-01-14 ASSESSMENT — PAIN DESCRIPTION - PROGRESSION: CLINICAL_PROGRESSION: NOT CHANGED

## 2019-01-14 ASSESSMENT — PAIN DESCRIPTION - ONSET: ONSET: ON-GOING

## 2019-01-14 ASSESSMENT — PAIN DESCRIPTION - LOCATION: LOCATION: FOOT

## 2019-01-14 ASSESSMENT — PAIN DESCRIPTION - ORIENTATION: ORIENTATION: RIGHT

## 2019-01-14 ASSESSMENT — PAIN DESCRIPTION - PAIN TYPE: TYPE: CHRONIC PAIN

## 2019-01-14 ASSESSMENT — PAIN - FUNCTIONAL ASSESSMENT: PAIN_FUNCTIONAL_ASSESSMENT: ACTIVITIES ARE NOT PREVENTED

## 2019-01-28 ENCOUNTER — HOSPITAL ENCOUNTER (OUTPATIENT)
Dept: WOUND CARE | Age: 59
Discharge: HOME | End: 2019-01-28

## 2019-02-04 ENCOUNTER — HOSPITAL ENCOUNTER (OUTPATIENT)
Dept: WOUND CARE | Age: 59
Discharge: HOME | End: 2019-02-04
Payer: COMMERCIAL

## 2019-02-04 VITALS — RESPIRATION RATE: 16 BRPM | HEART RATE: 80 BPM | SYSTOLIC BLOOD PRESSURE: 137 MMHG | DIASTOLIC BLOOD PRESSURE: 88 MMHG

## 2019-02-04 DIAGNOSIS — E11.621 DIABETIC ULCER OF RIGHT MIDFOOT ASSOCIATED WITH TYPE 2 DIABETES MELLITUS, WITH FAT LAYER EXPOSED (HCC): ICD-10-CM

## 2019-02-04 DIAGNOSIS — L97.512 RIGHT FOOT ULCER, WITH FAT LAYER EXPOSED (HCC): Primary | ICD-10-CM

## 2019-02-04 DIAGNOSIS — L97.412 DIABETIC ULCER OF RIGHT MIDFOOT ASSOCIATED WITH TYPE 2 DIABETES MELLITUS, WITH FAT LAYER EXPOSED (HCC): ICD-10-CM

## 2019-02-04 PROCEDURE — 29445 APPL RIGID TOT CNTC LEG CAST: CPT

## 2019-02-04 ASSESSMENT — PAIN DESCRIPTION - PROGRESSION: CLINICAL_PROGRESSION: NOT CHANGED

## 2019-02-04 ASSESSMENT — PAIN DESCRIPTION - ONSET: ONSET: ON-GOING

## 2019-02-04 ASSESSMENT — PAIN DESCRIPTION - ORIENTATION: ORIENTATION: RIGHT

## 2019-02-04 ASSESSMENT — PAIN DESCRIPTION - FREQUENCY: FREQUENCY: INTERMITTENT

## 2019-02-04 ASSESSMENT — PAIN DESCRIPTION - PAIN TYPE: TYPE: CHRONIC PAIN

## 2019-02-04 ASSESSMENT — PAIN SCALES - GENERAL: PAINLEVEL_OUTOF10: 4

## 2019-02-04 ASSESSMENT — PAIN DESCRIPTION - LOCATION: LOCATION: FOOT

## 2019-02-04 ASSESSMENT — PAIN DESCRIPTION - DESCRIPTORS: DESCRIPTORS: TINGLING

## 2019-02-07 ENCOUNTER — HOSPITAL ENCOUNTER (OUTPATIENT)
Dept: WOUND CARE | Age: 59
Discharge: HOME | End: 2019-02-07
Payer: COMMERCIAL

## 2019-02-07 VITALS
DIASTOLIC BLOOD PRESSURE: 93 MMHG | RESPIRATION RATE: 16 BRPM | TEMPERATURE: 97.1 F | HEART RATE: 78 BPM | SYSTOLIC BLOOD PRESSURE: 131 MMHG

## 2019-02-07 DIAGNOSIS — L97.512 RIGHT FOOT ULCER, WITH FAT LAYER EXPOSED (HCC): Primary | ICD-10-CM

## 2019-02-07 DIAGNOSIS — E11.621 DIABETIC ULCER OF RIGHT MIDFOOT ASSOCIATED WITH TYPE 2 DIABETES MELLITUS, WITH FAT LAYER EXPOSED (HCC): ICD-10-CM

## 2019-02-07 DIAGNOSIS — L97.412 DIABETIC ULCER OF RIGHT MIDFOOT ASSOCIATED WITH TYPE 2 DIABETES MELLITUS, WITH FAT LAYER EXPOSED (HCC): ICD-10-CM

## 2019-02-07 PROCEDURE — 29445 APPL RIGID TOT CNTC LEG CAST: CPT

## 2019-02-07 ASSESSMENT — PAIN DESCRIPTION - ORIENTATION: ORIENTATION: RIGHT

## 2019-02-07 ASSESSMENT — PAIN SCALES - GENERAL: PAINLEVEL_OUTOF10: 3

## 2019-02-07 ASSESSMENT — PAIN DESCRIPTION - FREQUENCY: FREQUENCY: INTERMITTENT

## 2019-02-07 ASSESSMENT — PAIN DESCRIPTION - LOCATION: LOCATION: FOOT

## 2019-02-07 ASSESSMENT — PAIN DESCRIPTION - PROGRESSION: CLINICAL_PROGRESSION: NOT CHANGED

## 2019-02-07 ASSESSMENT — PAIN DESCRIPTION - PAIN TYPE: TYPE: CHRONIC PAIN

## 2019-02-07 ASSESSMENT — PAIN DESCRIPTION - ONSET: ONSET: ON-GOING

## 2019-02-07 ASSESSMENT — PAIN DESCRIPTION - DESCRIPTORS: DESCRIPTORS: THROBBING

## 2019-02-11 ENCOUNTER — HOSPITAL ENCOUNTER (OUTPATIENT)
Dept: WOUND CARE | Age: 59
Discharge: HOME | End: 2019-02-11
Payer: COMMERCIAL

## 2019-02-11 VITALS
RESPIRATION RATE: 16 BRPM | DIASTOLIC BLOOD PRESSURE: 74 MMHG | SYSTOLIC BLOOD PRESSURE: 112 MMHG | HEART RATE: 81 BPM | TEMPERATURE: 97.9 F

## 2019-02-11 DIAGNOSIS — E11.621 DIABETIC ULCER OF RIGHT MIDFOOT ASSOCIATED WITH TYPE 2 DIABETES MELLITUS, WITH FAT LAYER EXPOSED (HCC): ICD-10-CM

## 2019-02-11 DIAGNOSIS — L97.512 RIGHT FOOT ULCER, WITH FAT LAYER EXPOSED (HCC): Primary | ICD-10-CM

## 2019-02-11 DIAGNOSIS — L97.412 DIABETIC ULCER OF RIGHT MIDFOOT ASSOCIATED WITH TYPE 2 DIABETES MELLITUS, WITH FAT LAYER EXPOSED (HCC): ICD-10-CM

## 2019-02-11 PROCEDURE — 11042 DBRDMT SUBQ TIS 1ST 20SQCM/<: CPT

## 2019-02-11 PROCEDURE — 29445 APPL RIGID TOT CNTC LEG CAST: CPT

## 2019-02-18 ENCOUNTER — HOSPITAL ENCOUNTER (OUTPATIENT)
Dept: WOUND CARE | Age: 59
Discharge: HOME | End: 2019-02-18
Payer: COMMERCIAL

## 2019-02-18 VITALS
HEART RATE: 92 BPM | SYSTOLIC BLOOD PRESSURE: 116 MMHG | RESPIRATION RATE: 16 BRPM | TEMPERATURE: 98.1 F | DIASTOLIC BLOOD PRESSURE: 75 MMHG

## 2019-02-18 DIAGNOSIS — L97.512 RIGHT FOOT ULCER, WITH FAT LAYER EXPOSED (HCC): ICD-10-CM

## 2019-02-18 DIAGNOSIS — E11.621 DIABETIC ULCER OF RIGHT MIDFOOT ASSOCIATED WITH TYPE 2 DIABETES MELLITUS, WITH FAT LAYER EXPOSED (HCC): ICD-10-CM

## 2019-02-18 DIAGNOSIS — L97.412 DIABETIC ULCER OF RIGHT MIDFOOT ASSOCIATED WITH TYPE 2 DIABETES MELLITUS, WITH FAT LAYER EXPOSED (HCC): ICD-10-CM

## 2019-02-18 PROCEDURE — 87073 CULTURE BACTERIA ANAEROBIC: CPT

## 2019-02-18 PROCEDURE — 87071 CULTURE AEROBIC QUANT OTHER: CPT

## 2019-02-18 PROCEDURE — 87077 CULTURE AEROBIC IDENTIFY: CPT

## 2019-02-18 PROCEDURE — 99213 OFFICE O/P EST LOW 20 MIN: CPT

## 2019-02-18 PROCEDURE — 87186 SC STD MICRODIL/AGAR DIL: CPT

## 2019-02-18 RX ORDER — DOXYCYCLINE HYCLATE 100 MG
100 TABLET ORAL 2 TIMES DAILY
Qty: 14 TABLET | Refills: 0 | Status: SHIPPED | OUTPATIENT
Start: 2019-02-18 | End: 2019-02-25

## 2019-02-18 ASSESSMENT — PAIN DESCRIPTION - LOCATION: LOCATION: LEG

## 2019-02-18 ASSESSMENT — PAIN - FUNCTIONAL ASSESSMENT: PAIN_FUNCTIONAL_ASSESSMENT: PREVENTS OR INTERFERES SOME ACTIVE ACTIVITIES AND ADLS

## 2019-02-18 ASSESSMENT — PAIN DESCRIPTION - DESCRIPTORS: DESCRIPTORS: TENDER

## 2019-02-18 ASSESSMENT — PAIN DESCRIPTION - ONSET: ONSET: ON-GOING

## 2019-02-18 ASSESSMENT — PAIN DESCRIPTION - FREQUENCY: FREQUENCY: INTERMITTENT

## 2019-02-18 ASSESSMENT — PAIN DESCRIPTION - ORIENTATION: ORIENTATION: RIGHT

## 2019-02-18 ASSESSMENT — PAIN DESCRIPTION - PAIN TYPE: TYPE: CHRONIC PAIN

## 2019-02-18 ASSESSMENT — PAIN DESCRIPTION - PROGRESSION: CLINICAL_PROGRESSION: NOT CHANGED

## 2019-02-18 ASSESSMENT — PAIN SCALES - GENERAL: PAINLEVEL_OUTOF10: 6

## 2019-02-23 LAB
CULTURE: NORMAL
Lab: NORMAL
ORGANISM: NORMAL
REPORT STATUS: NORMAL
SPECIMEN: NORMAL

## 2019-02-25 ENCOUNTER — HOSPITAL ENCOUNTER (OUTPATIENT)
Dept: WOUND CARE | Age: 59
Discharge: HOME | End: 2019-02-25
Payer: COMMERCIAL

## 2019-02-25 VITALS
RESPIRATION RATE: 16 BRPM | DIASTOLIC BLOOD PRESSURE: 82 MMHG | TEMPERATURE: 95.6 F | SYSTOLIC BLOOD PRESSURE: 135 MMHG | HEART RATE: 80 BPM

## 2019-02-25 DIAGNOSIS — E11.621 DIABETIC ULCER OF RIGHT MIDFOOT ASSOCIATED WITH TYPE 2 DIABETES MELLITUS, WITH FAT LAYER EXPOSED (HCC): ICD-10-CM

## 2019-02-25 DIAGNOSIS — L97.412 DIABETIC ULCER OF RIGHT MIDFOOT ASSOCIATED WITH TYPE 2 DIABETES MELLITUS, WITH FAT LAYER EXPOSED (HCC): ICD-10-CM

## 2019-02-25 DIAGNOSIS — L97.512 RIGHT FOOT ULCER, WITH FAT LAYER EXPOSED (HCC): Primary | ICD-10-CM

## 2019-02-25 PROCEDURE — 11042 DBRDMT SUBQ TIS 1ST 20SQCM/<: CPT

## 2019-02-25 RX ORDER — CIPROFLOXACIN 500 MG/1
500 TABLET, FILM COATED ORAL 2 TIMES DAILY
COMMUNITY
Start: 2019-02-25 | End: 2019-03-08

## 2019-02-25 ASSESSMENT — PAIN DESCRIPTION - PROGRESSION: CLINICAL_PROGRESSION: NOT CHANGED

## 2019-03-04 ENCOUNTER — HOSPITAL ENCOUNTER (OUTPATIENT)
Dept: WOUND CARE | Age: 59
Discharge: HOME | End: 2019-03-04
Payer: COMMERCIAL

## 2019-03-04 VITALS
RESPIRATION RATE: 16 BRPM | SYSTOLIC BLOOD PRESSURE: 123 MMHG | TEMPERATURE: 96.6 F | HEART RATE: 80 BPM | DIASTOLIC BLOOD PRESSURE: 76 MMHG

## 2019-03-04 DIAGNOSIS — L97.512 RIGHT FOOT ULCER, WITH FAT LAYER EXPOSED (HCC): ICD-10-CM

## 2019-03-04 DIAGNOSIS — E11.621 DIABETIC ULCER OF RIGHT MIDFOOT ASSOCIATED WITH TYPE 2 DIABETES MELLITUS, WITH FAT LAYER EXPOSED (HCC): ICD-10-CM

## 2019-03-04 DIAGNOSIS — L97.412 DIABETIC ULCER OF RIGHT MIDFOOT ASSOCIATED WITH TYPE 2 DIABETES MELLITUS, WITH FAT LAYER EXPOSED (HCC): ICD-10-CM

## 2019-03-04 PROCEDURE — 11042 DBRDMT SUBQ TIS 1ST 20SQCM/<: CPT

## 2019-03-04 ASSESSMENT — PAIN DESCRIPTION - DESCRIPTORS: DESCRIPTORS: ITCHING

## 2019-03-04 ASSESSMENT — PAIN DESCRIPTION - PAIN TYPE: TYPE: CHRONIC PAIN

## 2019-03-04 ASSESSMENT — PAIN DESCRIPTION - LOCATION: LOCATION: FOOT

## 2019-03-04 ASSESSMENT — PAIN DESCRIPTION - PROGRESSION: CLINICAL_PROGRESSION: NOT CHANGED

## 2019-03-04 ASSESSMENT — PAIN DESCRIPTION - ORIENTATION: ORIENTATION: RIGHT

## 2019-03-04 ASSESSMENT — PAIN SCALES - GENERAL: PAINLEVEL_OUTOF10: 5

## 2019-03-04 ASSESSMENT — PAIN DESCRIPTION - FREQUENCY: FREQUENCY: INTERMITTENT

## 2019-03-04 ASSESSMENT — PAIN - FUNCTIONAL ASSESSMENT: PAIN_FUNCTIONAL_ASSESSMENT: PREVENTS OR INTERFERES SOME ACTIVE ACTIVITIES AND ADLS

## 2019-03-04 ASSESSMENT — PAIN DESCRIPTION - ONSET: ONSET: ON-GOING

## 2019-03-11 ENCOUNTER — HOSPITAL ENCOUNTER (OUTPATIENT)
Dept: WOUND CARE | Age: 59
Discharge: HOME | End: 2019-03-11
Payer: COMMERCIAL

## 2019-03-11 ENCOUNTER — HOSPITAL ENCOUNTER (OUTPATIENT)
Dept: GENERAL RADIOLOGY | Age: 59
Discharge: HOME | End: 2019-03-11
Payer: COMMERCIAL

## 2019-03-11 ENCOUNTER — HOSPITAL ENCOUNTER (OUTPATIENT)
Age: 59
Discharge: HOME | End: 2019-03-11
Payer: COMMERCIAL

## 2019-03-11 VITALS
SYSTOLIC BLOOD PRESSURE: 134 MMHG | HEART RATE: 98 BPM | TEMPERATURE: 98.6 F | DIASTOLIC BLOOD PRESSURE: 96 MMHG | RESPIRATION RATE: 16 BRPM

## 2019-03-11 DIAGNOSIS — M86.9 DIABETIC FOOT ULCER WITH OSTEOMYELITIS (HCC): ICD-10-CM

## 2019-03-11 DIAGNOSIS — E11.69 DIABETIC FOOT ULCER WITH OSTEOMYELITIS (HCC): ICD-10-CM

## 2019-03-11 DIAGNOSIS — L97.412 DIABETIC ULCER OF RIGHT MIDFOOT ASSOCIATED WITH TYPE 2 DIABETES MELLITUS, WITH FAT LAYER EXPOSED (HCC): ICD-10-CM

## 2019-03-11 DIAGNOSIS — L97.509 DIABETIC FOOT ULCER WITH OSTEOMYELITIS (HCC): ICD-10-CM

## 2019-03-11 DIAGNOSIS — L97.412 ULCER OF RIGHT HEEL, WITH FAT LAYER EXPOSED (HCC): ICD-10-CM

## 2019-03-11 DIAGNOSIS — E11.621 DIABETIC FOOT ULCER WITH OSTEOMYELITIS (HCC): ICD-10-CM

## 2019-03-11 DIAGNOSIS — E11.621 DIABETIC ULCER OF RIGHT MIDFOOT ASSOCIATED WITH TYPE 2 DIABETES MELLITUS, WITH FAT LAYER EXPOSED (HCC): ICD-10-CM

## 2019-03-11 DIAGNOSIS — L97.512 RIGHT FOOT ULCER, WITH FAT LAYER EXPOSED (HCC): Primary | ICD-10-CM

## 2019-03-11 PROCEDURE — 87073 CULTURE BACTERIA ANAEROBIC: CPT

## 2019-03-11 PROCEDURE — 73630 X-RAY EXAM OF FOOT: CPT

## 2019-03-11 PROCEDURE — 87071 CULTURE AEROBIC QUANT OTHER: CPT

## 2019-03-11 PROCEDURE — 87186 SC STD MICRODIL/AGAR DIL: CPT

## 2019-03-11 PROCEDURE — 87077 CULTURE AEROBIC IDENTIFY: CPT

## 2019-03-11 PROCEDURE — 11042 DBRDMT SUBQ TIS 1ST 20SQCM/<: CPT

## 2019-03-11 ASSESSMENT — PAIN DESCRIPTION - PAIN TYPE: TYPE: CHRONIC PAIN

## 2019-03-11 ASSESSMENT — PAIN SCALES - GENERAL: PAINLEVEL_OUTOF10: 8

## 2019-03-11 ASSESSMENT — PAIN DESCRIPTION - ONSET: ONSET: ON-GOING

## 2019-03-11 ASSESSMENT — PAIN DESCRIPTION - LOCATION: LOCATION: FOOT

## 2019-03-11 ASSESSMENT — PAIN DESCRIPTION - DESCRIPTORS: DESCRIPTORS: DISCOMFORT

## 2019-03-11 ASSESSMENT — PAIN DESCRIPTION - FREQUENCY: FREQUENCY: CONTINUOUS

## 2019-03-11 ASSESSMENT — PAIN DESCRIPTION - ORIENTATION: ORIENTATION: RIGHT

## 2019-03-14 LAB
CULTURE: ABNORMAL
CULTURE: ABNORMAL
Lab: ABNORMAL
SPECIMEN: ABNORMAL

## 2019-03-15 ENCOUNTER — HOSPITAL ENCOUNTER (INPATIENT)
Age: 59
LOS: 3 days | Discharge: HOME | DRG: 623 | End: 2019-03-18
Attending: EMERGENCY MEDICINE | Admitting: FAMILY MEDICINE
Payer: COMMERCIAL

## 2019-03-15 ENCOUNTER — APPOINTMENT (OUTPATIENT)
Dept: GENERAL RADIOLOGY | Age: 59
DRG: 623 | End: 2019-03-15
Payer: COMMERCIAL

## 2019-03-15 ENCOUNTER — APPOINTMENT (OUTPATIENT)
Dept: MRI IMAGING | Age: 59
DRG: 623 | End: 2019-03-15
Payer: COMMERCIAL

## 2019-03-15 ENCOUNTER — APPOINTMENT (OUTPATIENT)
Dept: ULTRASOUND IMAGING | Age: 59
DRG: 623 | End: 2019-03-15
Payer: COMMERCIAL

## 2019-03-15 DIAGNOSIS — S60.311A INFECTED ABRASION OF RIGHT THUMB, INITIAL ENCOUNTER: ICD-10-CM

## 2019-03-15 DIAGNOSIS — L08.9 RIGHT FOOT INFECTION: ICD-10-CM

## 2019-03-15 DIAGNOSIS — L97.512 RIGHT FOOT ULCER, WITH FAT LAYER EXPOSED (HCC): ICD-10-CM

## 2019-03-15 DIAGNOSIS — L08.9 INFECTED ABRASION OF RIGHT THUMB, INITIAL ENCOUNTER: ICD-10-CM

## 2019-03-15 DIAGNOSIS — L08.9 FOOT INFECTION: Primary | ICD-10-CM

## 2019-03-15 PROBLEM — E11.59 TYPE 2 DIABETES MELLITUS WITH CIRCULATORY DISORDER, WITHOUT LONG-TERM CURRENT USE OF INSULIN (HCC): Status: ACTIVE | Noted: 2019-03-15

## 2019-03-15 PROBLEM — I10 ESSENTIAL HYPERTENSION: Chronic | Status: ACTIVE | Noted: 2019-03-15

## 2019-03-15 LAB
ALBUMIN SERPL-MCNC: 4.1 GM/DL (ref 3.4–5)
ALP BLD-CCNC: 66 IU/L (ref 40–129)
ALT SERPL-CCNC: 37 U/L (ref 10–40)
ANION GAP SERPL CALCULATED.3IONS-SCNC: 10 MMOL/L (ref 4–16)
AST SERPL-CCNC: 29 IU/L (ref 15–37)
BASOPHILS ABSOLUTE: 0 K/CU MM
BASOPHILS RELATIVE PERCENT: 0.3 % (ref 0–1)
BILIRUB SERPL-MCNC: 0.7 MG/DL (ref 0–1)
BUN BLDV-MCNC: 15 MG/DL (ref 6–23)
CALCIUM SERPL-MCNC: 9.5 MG/DL (ref 8.3–10.6)
CHLORIDE BLD-SCNC: 94 MMOL/L (ref 99–110)
CO2: 31 MMOL/L (ref 21–32)
CREAT SERPL-MCNC: 1.1 MG/DL (ref 0.9–1.3)
DIFFERENTIAL TYPE: ABNORMAL
EOSINOPHILS ABSOLUTE: 0.1 K/CU MM
EOSINOPHILS RELATIVE PERCENT: 0.8 % (ref 0–3)
ERYTHROCYTE SEDIMENTATION RATE: 89 MM/HR (ref 0–20)
GFR AFRICAN AMERICAN: >60 ML/MIN/1.73M2
GFR NON-AFRICAN AMERICAN: >60 ML/MIN/1.73M2
GLUCOSE BLD-MCNC: 211 MG/DL (ref 70–99)
GLUCOSE BLD-MCNC: 217 MG/DL (ref 70–99)
HCT VFR BLD CALC: 40.8 % (ref 42–52)
HEMOGLOBIN: 13.6 GM/DL (ref 13.5–18)
HIGH SENSITIVE C-REACTIVE PROTEIN: 112.1 MG/L
IMMATURE NEUTROPHIL %: 0.2 % (ref 0–0.43)
LYMPHOCYTES ABSOLUTE: 2.3 K/CU MM
LYMPHOCYTES RELATIVE PERCENT: 19.1 % (ref 24–44)
MCH RBC QN AUTO: 29.3 PG (ref 27–31)
MCHC RBC AUTO-ENTMCNC: 33.3 % (ref 32–36)
MCV RBC AUTO: 87.9 FL (ref 78–100)
MONOCYTES ABSOLUTE: 1.2 K/CU MM
MONOCYTES RELATIVE PERCENT: 9.8 % (ref 0–4)
PDW BLD-RTO: 12.5 % (ref 11.7–14.9)
PLATELET # BLD: 194 K/CU MM (ref 140–440)
PMV BLD AUTO: 10.4 FL (ref 7.5–11.1)
POTASSIUM SERPL-SCNC: 4.3 MMOL/L (ref 3.5–5.1)
RBC # BLD: 4.64 M/CU MM (ref 4.6–6.2)
SEGMENTED NEUTROPHILS ABSOLUTE COUNT: 8.3 K/CU MM
SEGMENTED NEUTROPHILS RELATIVE PERCENT: 69.8 % (ref 36–66)
SODIUM BLD-SCNC: 135 MMOL/L (ref 135–145)
TOTAL IMMATURE NEUTOROPHIL: 0.02 K/CU MM
TOTAL PROTEIN: 7.6 GM/DL (ref 6.4–8.2)
WBC # BLD: 11.9 K/CU MM (ref 4–10.5)

## 2019-03-15 PROCEDURE — 85652 RBC SED RATE AUTOMATED: CPT

## 2019-03-15 PROCEDURE — 6370000000 HC RX 637 (ALT 250 FOR IP): Performed by: NURSE PRACTITIONER

## 2019-03-15 PROCEDURE — 86141 C-REACTIVE PROTEIN HS: CPT

## 2019-03-15 PROCEDURE — 6360000004 HC RX CONTRAST MEDICATION: Performed by: FAMILY MEDICINE

## 2019-03-15 PROCEDURE — 93925 LOWER EXTREMITY STUDY: CPT

## 2019-03-15 PROCEDURE — A9579 GAD-BASE MR CONTRAST NOS,1ML: HCPCS | Performed by: FAMILY MEDICINE

## 2019-03-15 PROCEDURE — 2140000000 HC CCU INTERMEDIATE R&B

## 2019-03-15 PROCEDURE — 82962 GLUCOSE BLOOD TEST: CPT

## 2019-03-15 PROCEDURE — 2500000003 HC RX 250 WO HCPCS: Performed by: FAMILY MEDICINE

## 2019-03-15 PROCEDURE — 2580000003 HC RX 258: Performed by: FAMILY MEDICINE

## 2019-03-15 PROCEDURE — 0JBQ0ZZ EXCISION OF RIGHT FOOT SUBCUTANEOUS TISSUE AND FASCIA, OPEN APPROACH: ICD-10-PCS | Performed by: PODIATRIST

## 2019-03-15 PROCEDURE — 2500000003 HC RX 250 WO HCPCS: Performed by: EMERGENCY MEDICINE

## 2019-03-15 PROCEDURE — 83036 HEMOGLOBIN GLYCOSYLATED A1C: CPT

## 2019-03-15 PROCEDURE — 80053 COMPREHEN METABOLIC PANEL: CPT

## 2019-03-15 PROCEDURE — 93971 EXTREMITY STUDY: CPT

## 2019-03-15 PROCEDURE — 99284 EMERGENCY DEPT VISIT MOD MDM: CPT

## 2019-03-15 PROCEDURE — 73630 X-RAY EXAM OF FOOT: CPT

## 2019-03-15 PROCEDURE — 85025 COMPLETE CBC W/AUTO DIFF WBC: CPT

## 2019-03-15 PROCEDURE — 73723 MRI JOINT LWR EXTR W/O&W/DYE: CPT

## 2019-03-15 PROCEDURE — 6360000002 HC RX W HCPCS: Performed by: FAMILY MEDICINE

## 2019-03-15 PROCEDURE — 4500000028 HC INTERMEDIATE PROCEDURE

## 2019-03-15 PROCEDURE — 6370000000 HC RX 637 (ALT 250 FOR IP): Performed by: FAMILY MEDICINE

## 2019-03-15 RX ORDER — SODIUM CHLORIDE 0.9 % (FLUSH) 0.9 %
10 SYRINGE (ML) INJECTION EVERY 12 HOURS SCHEDULED
Status: DISCONTINUED | OUTPATIENT
Start: 2019-03-15 | End: 2019-03-18 | Stop reason: HOSPADM

## 2019-03-15 RX ORDER — ICOSAPENT ETHYL 1000 MG/1
1 CAPSULE ORAL 3 TIMES DAILY
Status: DISCONTINUED | OUTPATIENT
Start: 2019-03-15 | End: 2019-03-18 | Stop reason: HOSPADM

## 2019-03-15 RX ORDER — LISINOPRIL 10 MG/1
10 TABLET ORAL DAILY
Status: DISCONTINUED | OUTPATIENT
Start: 2019-03-16 | End: 2019-03-18 | Stop reason: HOSPADM

## 2019-03-15 RX ORDER — M-VIT,TX,IRON,MINS/CALC/FOLIC 27MG-0.4MG
1 TABLET ORAL DAILY
Status: DISCONTINUED | OUTPATIENT
Start: 2019-03-16 | End: 2019-03-18 | Stop reason: HOSPADM

## 2019-03-15 RX ORDER — ICOSAPENT ETHYL 1000 MG/1
CAPSULE ORAL
COMMUNITY
Start: 2019-02-07

## 2019-03-15 RX ORDER — LIDOCAINE HYDROCHLORIDE 20 MG/ML
5 INJECTION, SOLUTION INFILTRATION; PERINEURAL ONCE
Status: COMPLETED | OUTPATIENT
Start: 2019-03-15 | End: 2019-03-15

## 2019-03-15 RX ORDER — SIMVASTATIN 10 MG
10 TABLET ORAL DAILY
Status: DISCONTINUED | OUTPATIENT
Start: 2019-03-16 | End: 2019-03-18 | Stop reason: HOSPADM

## 2019-03-15 RX ORDER — ONDANSETRON 2 MG/ML
4 INJECTION INTRAMUSCULAR; INTRAVENOUS EVERY 6 HOURS PRN
Status: DISCONTINUED | OUTPATIENT
Start: 2019-03-15 | End: 2019-03-18 | Stop reason: HOSPADM

## 2019-03-15 RX ORDER — GABAPENTIN 300 MG/1
CAPSULE ORAL
COMMUNITY
Start: 2019-02-07 | End: 2019-03-15 | Stop reason: ALTCHOICE

## 2019-03-15 RX ORDER — CLINDAMYCIN PHOSPHATE 600 MG/50ML
600 INJECTION INTRAVENOUS ONCE
Status: COMPLETED | OUTPATIENT
Start: 2019-03-15 | End: 2019-03-15

## 2019-03-15 RX ORDER — VANCOMYCIN HYDROCHLORIDE 1 G/200ML
1000 INJECTION, SOLUTION INTRAVENOUS ONCE
Status: COMPLETED | OUTPATIENT
Start: 2019-03-15 | End: 2019-03-16

## 2019-03-15 RX ORDER — LISINOPRIL 10 MG/1
TABLET ORAL
COMMUNITY
Start: 2019-03-05

## 2019-03-15 RX ORDER — GABAPENTIN 400 MG/1
800 CAPSULE ORAL NIGHTLY
Status: DISCONTINUED | OUTPATIENT
Start: 2019-03-15 | End: 2019-03-18 | Stop reason: HOSPADM

## 2019-03-15 RX ORDER — SIMVASTATIN 40 MG
40 TABLET ORAL
COMMUNITY

## 2019-03-15 RX ORDER — NICOTINE POLACRILEX 4 MG
15 LOZENGE BUCCAL PRN
Status: DISCONTINUED | OUTPATIENT
Start: 2019-03-15 | End: 2019-03-18 | Stop reason: HOSPADM

## 2019-03-15 RX ORDER — DEXTROSE MONOHYDRATE 25 G/50ML
12.5 INJECTION, SOLUTION INTRAVENOUS PRN
Status: DISCONTINUED | OUTPATIENT
Start: 2019-03-15 | End: 2019-03-18 | Stop reason: HOSPADM

## 2019-03-15 RX ORDER — SODIUM CHLORIDE 0.9 % (FLUSH) 0.9 %
10 SYRINGE (ML) INJECTION PRN
Status: DISCONTINUED | OUTPATIENT
Start: 2019-03-15 | End: 2019-03-15

## 2019-03-15 RX ORDER — VANCOMYCIN HYDROCHLORIDE 1 G/200ML
1000 INJECTION, SOLUTION INTRAVENOUS ONCE
Status: COMPLETED | OUTPATIENT
Start: 2019-03-15 | End: 2019-03-15

## 2019-03-15 RX ORDER — SIMVASTATIN 10 MG
10 TABLET ORAL NIGHTLY
Status: DISCONTINUED | OUTPATIENT
Start: 2019-03-15 | End: 2019-03-15

## 2019-03-15 RX ORDER — OXYCODONE HYDROCHLORIDE AND ACETAMINOPHEN 5; 325 MG/1; MG/1
1 TABLET ORAL EVERY 4 HOURS PRN
Status: DISCONTINUED | OUTPATIENT
Start: 2019-03-15 | End: 2019-03-18 | Stop reason: HOSPADM

## 2019-03-15 RX ORDER — MAGNESIUM HYDROXIDE 1200 MG/15ML
LIQUID ORAL
Status: DISPENSED
Start: 2019-03-15 | End: 2019-03-16

## 2019-03-15 RX ORDER — SODIUM CHLORIDE 0.9 % (FLUSH) 0.9 %
10 SYRINGE (ML) INJECTION PRN
Status: DISCONTINUED | OUTPATIENT
Start: 2019-03-15 | End: 2019-03-18 | Stop reason: HOSPADM

## 2019-03-15 RX ORDER — DEXTROSE MONOHYDRATE 50 MG/ML
100 INJECTION, SOLUTION INTRAVENOUS PRN
Status: DISCONTINUED | OUTPATIENT
Start: 2019-03-15 | End: 2019-03-18 | Stop reason: HOSPADM

## 2019-03-15 RX ADMIN — SODIUM CHLORIDE, PRESERVATIVE FREE 10 ML: 5 INJECTION INTRAVENOUS at 21:54

## 2019-03-15 RX ADMIN — TAZOBACTAM SODIUM AND PIPERACILLIN SODIUM 3.38 G: 375; 3 INJECTION, SOLUTION INTRAVENOUS at 21:53

## 2019-03-15 RX ADMIN — LIDOCAINE HYDROCHLORIDE 5 ML: 20 INJECTION, SOLUTION INFILTRATION; PERINEURAL at 15:36

## 2019-03-15 RX ADMIN — CLINDAMYCIN IN 5 PERCENT DEXTROSE 600 MG: 12 INJECTION, SOLUTION INTRAVENOUS at 16:37

## 2019-03-15 RX ADMIN — VANCOMYCIN HYDROCHLORIDE 1000 MG: 1 INJECTION, SOLUTION INTRAVENOUS at 22:20

## 2019-03-15 RX ADMIN — OXYCODONE AND ACETAMINOPHEN 1 TABLET: 5; 325 TABLET ORAL at 23:18

## 2019-03-15 RX ADMIN — ICOSAPENT ETHYL 1 CAPSULE: 1000 CAPSULE ORAL at 22:44

## 2019-03-15 RX ADMIN — SIMVASTATIN 10 MG: 10 TABLET, FILM COATED ORAL at 22:43

## 2019-03-15 RX ADMIN — GADOTERIDOL 20 ML: 279.3 INJECTION, SOLUTION INTRAVENOUS at 20:43

## 2019-03-15 RX ADMIN — VANCOMYCIN HYDROCHLORIDE 1000 MG: 1 INJECTION, SOLUTION INTRAVENOUS at 23:21

## 2019-03-15 RX ADMIN — INSULIN LISPRO 2 UNITS: 100 INJECTION, SOLUTION INTRAVENOUS; SUBCUTANEOUS at 21:53

## 2019-03-15 RX ADMIN — FAMOTIDINE 20 MG: 10 INJECTION, SOLUTION INTRAVENOUS at 21:53

## 2019-03-15 RX ADMIN — GABAPENTIN 800 MG: 400 CAPSULE ORAL at 22:43

## 2019-03-15 RX ADMIN — ENOXAPARIN SODIUM 40 MG: 40 INJECTION SUBCUTANEOUS at 21:53

## 2019-03-15 ASSESSMENT — PAIN SCALES - GENERAL
PAINLEVEL_OUTOF10: 10
PAINLEVEL_OUTOF10: 0

## 2019-03-15 ASSESSMENT — PAIN DESCRIPTION - PAIN TYPE: TYPE: ACUTE PAIN;SURGICAL PAIN

## 2019-03-15 ASSESSMENT — PAIN DESCRIPTION - DESCRIPTORS: DESCRIPTORS: ACHING

## 2019-03-15 ASSESSMENT — PAIN DESCRIPTION - FREQUENCY: FREQUENCY: CONTINUOUS

## 2019-03-16 LAB
ANION GAP SERPL CALCULATED.3IONS-SCNC: 11 MMOL/L (ref 4–16)
BUN BLDV-MCNC: 19 MG/DL (ref 6–23)
CALCIUM SERPL-MCNC: 9 MG/DL (ref 8.3–10.6)
CHLORIDE BLD-SCNC: 96 MMOL/L (ref 99–110)
CO2: 28 MMOL/L (ref 21–32)
CREAT SERPL-MCNC: 1.2 MG/DL (ref 0.9–1.3)
ESTIMATED AVERAGE GLUCOSE: 203 MG/DL
GFR AFRICAN AMERICAN: >60 ML/MIN/1.73M2
GFR NON-AFRICAN AMERICAN: >60 ML/MIN/1.73M2
GLUCOSE BLD-MCNC: 171 MG/DL (ref 70–99)
GLUCOSE BLD-MCNC: 204 MG/DL (ref 70–99)
GLUCOSE BLD-MCNC: 236 MG/DL (ref 70–99)
GLUCOSE BLD-MCNC: 278 MG/DL (ref 70–99)
GLUCOSE BLD-MCNC: 339 MG/DL (ref 70–99)
HBA1C MFR BLD: 8.7 % (ref 4.2–6.3)
HCT VFR BLD CALC: 38.9 % (ref 42–52)
HEMOGLOBIN: 12.7 GM/DL (ref 13.5–18)
HIGH SENSITIVE C-REACTIVE PROTEIN: 119.1 MG/L
MCH RBC QN AUTO: 29.1 PG (ref 27–31)
MCHC RBC AUTO-ENTMCNC: 32.6 % (ref 32–36)
MCV RBC AUTO: 89.2 FL (ref 78–100)
PDW BLD-RTO: 12.5 % (ref 11.7–14.9)
PLATELET # BLD: 182 K/CU MM (ref 140–440)
PMV BLD AUTO: 10.5 FL (ref 7.5–11.1)
POTASSIUM SERPL-SCNC: 4.1 MMOL/L (ref 3.5–5.1)
RBC # BLD: 4.36 M/CU MM (ref 4.6–6.2)
SODIUM BLD-SCNC: 135 MMOL/L (ref 135–145)
WBC # BLD: 9.2 K/CU MM (ref 4–10.5)

## 2019-03-16 PROCEDURE — 87147 CULTURE TYPE IMMUNOLOGIC: CPT

## 2019-03-16 PROCEDURE — 2500000003 HC RX 250 WO HCPCS: Performed by: FAMILY MEDICINE

## 2019-03-16 PROCEDURE — 6370000000 HC RX 637 (ALT 250 FOR IP): Performed by: NURSE PRACTITIONER

## 2019-03-16 PROCEDURE — 36415 COLL VENOUS BLD VENIPUNCTURE: CPT

## 2019-03-16 PROCEDURE — 2580000003 HC RX 258: Performed by: FAMILY MEDICINE

## 2019-03-16 PROCEDURE — 6370000000 HC RX 637 (ALT 250 FOR IP): Performed by: FAMILY MEDICINE

## 2019-03-16 PROCEDURE — 87071 CULTURE AEROBIC QUANT OTHER: CPT

## 2019-03-16 PROCEDURE — 85027 COMPLETE CBC AUTOMATED: CPT

## 2019-03-16 PROCEDURE — 6360000002 HC RX W HCPCS: Performed by: FAMILY MEDICINE

## 2019-03-16 PROCEDURE — 87077 CULTURE AEROBIC IDENTIFY: CPT

## 2019-03-16 PROCEDURE — 87073 CULTURE BACTERIA ANAEROBIC: CPT

## 2019-03-16 PROCEDURE — 97161 PT EVAL LOW COMPLEX 20 MIN: CPT

## 2019-03-16 PROCEDURE — 2140000000 HC CCU INTERMEDIATE R&B

## 2019-03-16 PROCEDURE — 6370000000 HC RX 637 (ALT 250 FOR IP): Performed by: HOSPITALIST

## 2019-03-16 PROCEDURE — 80048 BASIC METABOLIC PNL TOTAL CA: CPT

## 2019-03-16 PROCEDURE — 86141 C-REACTIVE PROTEIN HS: CPT

## 2019-03-16 PROCEDURE — 82962 GLUCOSE BLOOD TEST: CPT

## 2019-03-16 PROCEDURE — 6360000002 HC RX W HCPCS: Performed by: HOSPITALIST

## 2019-03-16 PROCEDURE — 97530 THERAPEUTIC ACTIVITIES: CPT

## 2019-03-16 PROCEDURE — 87186 SC STD MICRODIL/AGAR DIL: CPT

## 2019-03-16 RX ORDER — VANCOMYCIN HYDROCHLORIDE 1 G/200ML
1000 INJECTION, SOLUTION INTRAVENOUS EVERY 12 HOURS
Status: DISCONTINUED | OUTPATIENT
Start: 2019-03-16 | End: 2019-03-18 | Stop reason: HOSPADM

## 2019-03-16 RX ORDER — 0.9 % SODIUM CHLORIDE 0.9 %
75 INTRAVENOUS SOLUTION INTRAVENOUS SEE ADMIN INSTRUCTIONS
Status: DISCONTINUED | OUTPATIENT
Start: 2019-03-16 | End: 2019-03-17 | Stop reason: SDUPTHER

## 2019-03-16 RX ADMIN — SODIUM CHLORIDE, PRESERVATIVE FREE 10 ML: 5 INJECTION INTRAVENOUS at 08:59

## 2019-03-16 RX ADMIN — FAMOTIDINE 20 MG: 10 INJECTION, SOLUTION INTRAVENOUS at 08:58

## 2019-03-16 RX ADMIN — TAZOBACTAM SODIUM AND PIPERACILLIN SODIUM 3.38 G: 375; 3 INJECTION, SOLUTION INTRAVENOUS at 21:07

## 2019-03-16 RX ADMIN — MULTIPLE VITAMINS W/ MINERALS TAB 1 TABLET: TAB at 08:58

## 2019-03-16 RX ADMIN — ICOSAPENT ETHYL 1 CAPSULE: 1000 CAPSULE ORAL at 21:12

## 2019-03-16 RX ADMIN — SIMVASTATIN 10 MG: 10 TABLET, FILM COATED ORAL at 21:08

## 2019-03-16 RX ADMIN — TAZOBACTAM SODIUM AND PIPERACILLIN SODIUM 3.38 G: 375; 3 INJECTION, SOLUTION INTRAVENOUS at 03:45

## 2019-03-16 RX ADMIN — ICOSAPENT ETHYL 1 CAPSULE: 1000 CAPSULE ORAL at 09:04

## 2019-03-16 RX ADMIN — VANCOMYCIN HYDROCHLORIDE 2000 MG: 1 INJECTION, POWDER, LYOPHILIZED, FOR SOLUTION INTRAVENOUS at 09:03

## 2019-03-16 RX ADMIN — FAMOTIDINE 20 MG: 10 INJECTION, SOLUTION INTRAVENOUS at 21:08

## 2019-03-16 RX ADMIN — OXYCODONE AND ACETAMINOPHEN 1 TABLET: 5; 325 TABLET ORAL at 15:19

## 2019-03-16 RX ADMIN — ICOSAPENT ETHYL 1 CAPSULE: 1000 CAPSULE ORAL at 14:04

## 2019-03-16 RX ADMIN — GABAPENTIN 800 MG: 400 CAPSULE ORAL at 21:08

## 2019-03-16 RX ADMIN — INSULIN LISPRO 4 UNITS: 100 INJECTION, SOLUTION INTRAVENOUS; SUBCUTANEOUS at 08:59

## 2019-03-16 RX ADMIN — INSULIN LISPRO 2 UNITS: 100 INJECTION, SOLUTION INTRAVENOUS; SUBCUTANEOUS at 17:27

## 2019-03-16 RX ADMIN — SODIUM CHLORIDE, PRESERVATIVE FREE 10 ML: 5 INJECTION INTRAVENOUS at 21:48

## 2019-03-16 RX ADMIN — OXYCODONE AND ACETAMINOPHEN 1 TABLET: 5; 325 TABLET ORAL at 21:29

## 2019-03-16 RX ADMIN — OXYCODONE AND ACETAMINOPHEN 1 TABLET: 5; 325 TABLET ORAL at 08:58

## 2019-03-16 RX ADMIN — TAZOBACTAM SODIUM AND PIPERACILLIN SODIUM 3.38 G: 375; 3 INJECTION, SOLUTION INTRAVENOUS at 13:53

## 2019-03-16 RX ADMIN — INSULIN LISPRO 4 UNITS: 100 INJECTION, SOLUTION INTRAVENOUS; SUBCUTANEOUS at 21:09

## 2019-03-16 RX ADMIN — VANCOMYCIN HYDROCHLORIDE 1000 MG: 1 INJECTION, SOLUTION INTRAVENOUS at 22:52

## 2019-03-16 RX ADMIN — INSULIN GLARGINE 5 UNITS: 100 INJECTION, SOLUTION SUBCUTANEOUS at 21:09

## 2019-03-16 RX ADMIN — ENOXAPARIN SODIUM 40 MG: 40 INJECTION SUBCUTANEOUS at 21:08

## 2019-03-16 RX ADMIN — INSULIN LISPRO 6 UNITS: 100 INJECTION, SOLUTION INTRAVENOUS; SUBCUTANEOUS at 13:51

## 2019-03-16 RX ADMIN — LISINOPRIL 10 MG: 10 TABLET ORAL at 08:58

## 2019-03-16 RX ADMIN — VANCOMYCIN HYDROCHLORIDE 1000 MG: 1 INJECTION, SOLUTION INTRAVENOUS at 21:45

## 2019-03-16 ASSESSMENT — PAIN DESCRIPTION - DESCRIPTORS
DESCRIPTORS: ACHING

## 2019-03-16 ASSESSMENT — PAIN DESCRIPTION - FREQUENCY
FREQUENCY: CONTINUOUS

## 2019-03-16 ASSESSMENT — PAIN SCALES - GENERAL
PAINLEVEL_OUTOF10: 7
PAINLEVEL_OUTOF10: 5
PAINLEVEL_OUTOF10: 0
PAINLEVEL_OUTOF10: 0
PAINLEVEL_OUTOF10: 4
PAINLEVEL_OUTOF10: 7
PAINLEVEL_OUTOF10: 0
PAINLEVEL_OUTOF10: 8
PAINLEVEL_OUTOF10: 0
PAINLEVEL_OUTOF10: 3
PAINLEVEL_OUTOF10: 7
PAINLEVEL_OUTOF10: 4
PAINLEVEL_OUTOF10: 7
PAINLEVEL_OUTOF10: 0
PAINLEVEL_OUTOF10: 7
PAINLEVEL_OUTOF10: 0

## 2019-03-16 ASSESSMENT — PAIN DESCRIPTION - PAIN TYPE
TYPE: ACUTE PAIN;SURGICAL PAIN
TYPE: ACUTE PAIN
TYPE: ACUTE PAIN;SURGICAL PAIN

## 2019-03-16 ASSESSMENT — PAIN DESCRIPTION - PROGRESSION
CLINICAL_PROGRESSION: RAPIDLY IMPROVING
CLINICAL_PROGRESSION: GRADUALLY WORSENING
CLINICAL_PROGRESSION: GRADUALLY WORSENING
CLINICAL_PROGRESSION: RAPIDLY IMPROVING
CLINICAL_PROGRESSION: GRADUALLY WORSENING
CLINICAL_PROGRESSION: GRADUALLY IMPROVING
CLINICAL_PROGRESSION: NOT CHANGED
CLINICAL_PROGRESSION: GRADUALLY IMPROVING
CLINICAL_PROGRESSION: GRADUALLY WORSENING
CLINICAL_PROGRESSION: GRADUALLY IMPROVING

## 2019-03-16 ASSESSMENT — PAIN DESCRIPTION - ORIENTATION
ORIENTATION: RIGHT

## 2019-03-16 ASSESSMENT — PAIN DESCRIPTION - LOCATION
LOCATION: FOOT

## 2019-03-16 ASSESSMENT — PAIN - FUNCTIONAL ASSESSMENT
PAIN_FUNCTIONAL_ASSESSMENT: PREVENTS OR INTERFERES SOME ACTIVE ACTIVITIES AND ADLS

## 2019-03-16 ASSESSMENT — PAIN DESCRIPTION - ONSET
ONSET: ON-GOING

## 2019-03-17 LAB
ANION GAP SERPL CALCULATED.3IONS-SCNC: 14 MMOL/L (ref 4–16)
BUN BLDV-MCNC: 13 MG/DL (ref 6–23)
CALCIUM SERPL-MCNC: 8.8 MG/DL (ref 8.3–10.6)
CHLORIDE BLD-SCNC: 99 MMOL/L (ref 99–110)
CO2: 25 MMOL/L (ref 21–32)
CREAT SERPL-MCNC: 1.1 MG/DL (ref 0.9–1.3)
DOSE AMOUNT: NORMAL
DOSE TIME: NORMAL
GFR AFRICAN AMERICAN: >60 ML/MIN/1.73M2
GFR NON-AFRICAN AMERICAN: >60 ML/MIN/1.73M2
GLUCOSE BLD-MCNC: 202 MG/DL (ref 70–99)
GLUCOSE BLD-MCNC: 225 MG/DL (ref 70–99)
GLUCOSE BLD-MCNC: 230 MG/DL (ref 70–99)
GLUCOSE BLD-MCNC: 233 MG/DL (ref 70–99)
GLUCOSE BLD-MCNC: 282 MG/DL (ref 70–99)
GLUCOSE BLD-MCNC: 316 MG/DL (ref 70–99)
HCT VFR BLD CALC: 38.3 % (ref 42–52)
HEMOGLOBIN: 12.4 GM/DL (ref 13.5–18)
HIGH SENSITIVE C-REACTIVE PROTEIN: 66.4 MG/L
MCH RBC QN AUTO: 28.6 PG (ref 27–31)
MCHC RBC AUTO-ENTMCNC: 32.4 % (ref 32–36)
MCV RBC AUTO: 88.5 FL (ref 78–100)
PDW BLD-RTO: 12.3 % (ref 11.7–14.9)
PLATELET # BLD: 204 K/CU MM (ref 140–440)
PMV BLD AUTO: 10.3 FL (ref 7.5–11.1)
POTASSIUM SERPL-SCNC: 4.5 MMOL/L (ref 3.5–5.1)
RBC # BLD: 4.33 M/CU MM (ref 4.6–6.2)
SODIUM BLD-SCNC: 138 MMOL/L (ref 135–145)
VANCOMYCIN TROUGH: 17.1 UG/ML (ref 10–20)
WBC # BLD: 7.6 K/CU MM (ref 4–10.5)

## 2019-03-17 PROCEDURE — 2580000003 HC RX 258: Performed by: HOSPITALIST

## 2019-03-17 PROCEDURE — 6370000000 HC RX 637 (ALT 250 FOR IP): Performed by: FAMILY MEDICINE

## 2019-03-17 PROCEDURE — 6360000002 HC RX W HCPCS: Performed by: FAMILY MEDICINE

## 2019-03-17 PROCEDURE — 80048 BASIC METABOLIC PNL TOTAL CA: CPT

## 2019-03-17 PROCEDURE — 80202 ASSAY OF VANCOMYCIN: CPT

## 2019-03-17 PROCEDURE — 85027 COMPLETE CBC AUTOMATED: CPT

## 2019-03-17 PROCEDURE — 36415 COLL VENOUS BLD VENIPUNCTURE: CPT

## 2019-03-17 PROCEDURE — 2580000003 HC RX 258: Performed by: FAMILY MEDICINE

## 2019-03-17 PROCEDURE — 82962 GLUCOSE BLOOD TEST: CPT

## 2019-03-17 PROCEDURE — 2500000003 HC RX 250 WO HCPCS: Performed by: FAMILY MEDICINE

## 2019-03-17 PROCEDURE — 2140000000 HC CCU INTERMEDIATE R&B

## 2019-03-17 PROCEDURE — 86141 C-REACTIVE PROTEIN HS: CPT

## 2019-03-17 PROCEDURE — 6360000002 HC RX W HCPCS: Performed by: HOSPITALIST

## 2019-03-17 PROCEDURE — 6370000000 HC RX 637 (ALT 250 FOR IP): Performed by: NURSE PRACTITIONER

## 2019-03-17 RX ORDER — SODIUM CHLORIDE 9 MG/ML
INJECTION, SOLUTION INTRAVENOUS SEE ADMIN INSTRUCTIONS
Status: DISCONTINUED | OUTPATIENT
Start: 2019-03-17 | End: 2019-03-18 | Stop reason: HOSPADM

## 2019-03-17 RX ADMIN — OXYCODONE AND ACETAMINOPHEN 1 TABLET: 5; 325 TABLET ORAL at 13:15

## 2019-03-17 RX ADMIN — SODIUM CHLORIDE, PRESERVATIVE FREE 10 ML: 5 INJECTION INTRAVENOUS at 19:54

## 2019-03-17 RX ADMIN — INSULIN LISPRO 6 UNITS: 100 INJECTION, SOLUTION INTRAVENOUS; SUBCUTANEOUS at 13:09

## 2019-03-17 RX ADMIN — INSULIN LISPRO 8 UNITS: 100 INJECTION, SOLUTION INTRAVENOUS; SUBCUTANEOUS at 17:06

## 2019-03-17 RX ADMIN — ICOSAPENT ETHYL 1 CAPSULE: 1000 CAPSULE ORAL at 19:54

## 2019-03-17 RX ADMIN — LISINOPRIL 10 MG: 10 TABLET ORAL at 08:21

## 2019-03-17 RX ADMIN — FAMOTIDINE 20 MG: 10 INJECTION, SOLUTION INTRAVENOUS at 19:52

## 2019-03-17 RX ADMIN — GABAPENTIN 800 MG: 400 CAPSULE ORAL at 19:53

## 2019-03-17 RX ADMIN — INSULIN LISPRO 4 UNITS: 100 INJECTION, SOLUTION INTRAVENOUS; SUBCUTANEOUS at 08:22

## 2019-03-17 RX ADMIN — TAZOBACTAM SODIUM AND PIPERACILLIN SODIUM 3.38 G: 375; 3 INJECTION, SOLUTION INTRAVENOUS at 17:08

## 2019-03-17 RX ADMIN — VANCOMYCIN HYDROCHLORIDE 1000 MG: 1 INJECTION, SOLUTION INTRAVENOUS at 21:47

## 2019-03-17 RX ADMIN — VANCOMYCIN HYDROCHLORIDE 1000 MG: 1 INJECTION, SOLUTION INTRAVENOUS at 09:08

## 2019-03-17 RX ADMIN — SODIUM CHLORIDE, PRESERVATIVE FREE 10 ML: 5 INJECTION INTRAVENOUS at 08:20

## 2019-03-17 RX ADMIN — INSULIN LISPRO 2 UNITS: 100 INJECTION, SOLUTION INTRAVENOUS; SUBCUTANEOUS at 19:53

## 2019-03-17 RX ADMIN — ICOSAPENT ETHYL 1 CAPSULE: 1000 CAPSULE ORAL at 13:15

## 2019-03-17 RX ADMIN — MULTIPLE VITAMINS W/ MINERALS TAB 1 TABLET: TAB at 08:21

## 2019-03-17 RX ADMIN — VANCOMYCIN HYDROCHLORIDE 1000 MG: 1 INJECTION, SOLUTION INTRAVENOUS at 10:20

## 2019-03-17 RX ADMIN — SODIUM CHLORIDE 75 ML: 9 INJECTION, SOLUTION INTRAVENOUS at 08:20

## 2019-03-17 RX ADMIN — OXYCODONE AND ACETAMINOPHEN 1 TABLET: 5; 325 TABLET ORAL at 03:20

## 2019-03-17 RX ADMIN — SIMVASTATIN 10 MG: 10 TABLET, FILM COATED ORAL at 19:53

## 2019-03-17 RX ADMIN — VANCOMYCIN HYDROCHLORIDE 1000 MG: 1 INJECTION, SOLUTION INTRAVENOUS at 22:57

## 2019-03-17 RX ADMIN — OXYCODONE AND ACETAMINOPHEN 1 TABLET: 5; 325 TABLET ORAL at 19:53

## 2019-03-17 RX ADMIN — TAZOBACTAM SODIUM AND PIPERACILLIN SODIUM 3.38 G: 375; 3 INJECTION, SOLUTION INTRAVENOUS at 03:20

## 2019-03-17 RX ADMIN — FAMOTIDINE 20 MG: 10 INJECTION, SOLUTION INTRAVENOUS at 08:21

## 2019-03-17 RX ADMIN — ENOXAPARIN SODIUM 40 MG: 40 INJECTION SUBCUTANEOUS at 19:51

## 2019-03-17 RX ADMIN — TAZOBACTAM SODIUM AND PIPERACILLIN SODIUM 3.38 G: 375; 3 INJECTION, SOLUTION INTRAVENOUS at 08:28

## 2019-03-17 RX ADMIN — ICOSAPENT ETHYL 1 CAPSULE: 1000 CAPSULE ORAL at 08:19

## 2019-03-17 ASSESSMENT — PAIN DESCRIPTION - FREQUENCY
FREQUENCY: CONTINUOUS
FREQUENCY: INTERMITTENT
FREQUENCY: INTERMITTENT

## 2019-03-17 ASSESSMENT — PAIN SCALES - GENERAL
PAINLEVEL_OUTOF10: 0
PAINLEVEL_OUTOF10: 6
PAINLEVEL_OUTOF10: 0
PAINLEVEL_OUTOF10: 4
PAINLEVEL_OUTOF10: 0
PAINLEVEL_OUTOF10: 4
PAINLEVEL_OUTOF10: 7
PAINLEVEL_OUTOF10: 0
PAINLEVEL_OUTOF10: 0
PAINLEVEL_OUTOF10: 7
PAINLEVEL_OUTOF10: 0
PAINLEVEL_OUTOF10: 0

## 2019-03-17 ASSESSMENT — PAIN DESCRIPTION - LOCATION
LOCATION: ANKLE;FOOT
LOCATION: FOOT

## 2019-03-17 ASSESSMENT — PAIN DESCRIPTION - PROGRESSION
CLINICAL_PROGRESSION: GRADUALLY WORSENING
CLINICAL_PROGRESSION: GRADUALLY IMPROVING
CLINICAL_PROGRESSION: GRADUALLY WORSENING

## 2019-03-17 ASSESSMENT — PAIN - FUNCTIONAL ASSESSMENT
PAIN_FUNCTIONAL_ASSESSMENT: PREVENTS OR INTERFERES SOME ACTIVE ACTIVITIES AND ADLS
PAIN_FUNCTIONAL_ASSESSMENT: PREVENTS OR INTERFERES SOME ACTIVE ACTIVITIES AND ADLS

## 2019-03-17 ASSESSMENT — PAIN DESCRIPTION - ORIENTATION
ORIENTATION: RIGHT

## 2019-03-17 ASSESSMENT — PAIN DESCRIPTION - ONSET
ONSET: ON-GOING
ONSET: ON-GOING

## 2019-03-17 ASSESSMENT — PAIN DESCRIPTION - PAIN TYPE
TYPE: ACUTE PAIN;SURGICAL PAIN
TYPE: ACUTE PAIN;SURGICAL PAIN
TYPE: ACUTE PAIN
TYPE: ACUTE PAIN

## 2019-03-17 ASSESSMENT — PAIN DESCRIPTION - DESCRIPTORS
DESCRIPTORS: ACHING;TINGLING
DESCRIPTORS: ACHING
DESCRIPTORS: ACHING

## 2019-03-18 VITALS
HEART RATE: 65 BPM | SYSTOLIC BLOOD PRESSURE: 123 MMHG | WEIGHT: 282.2 LBS | RESPIRATION RATE: 16 BRPM | TEMPERATURE: 97 F | DIASTOLIC BLOOD PRESSURE: 74 MMHG | BODY MASS INDEX: 36.22 KG/M2 | OXYGEN SATURATION: 97 % | HEIGHT: 74 IN

## 2019-03-18 LAB
ANION GAP SERPL CALCULATED.3IONS-SCNC: 16 MMOL/L (ref 4–16)
BUN BLDV-MCNC: 10 MG/DL (ref 6–23)
CALCIUM SERPL-MCNC: 9 MG/DL (ref 8.3–10.6)
CHLORIDE BLD-SCNC: 100 MMOL/L (ref 99–110)
CO2: 24 MMOL/L (ref 21–32)
CREAT SERPL-MCNC: 1.1 MG/DL (ref 0.9–1.3)
GFR AFRICAN AMERICAN: >60 ML/MIN/1.73M2
GFR NON-AFRICAN AMERICAN: >60 ML/MIN/1.73M2
GLUCOSE BLD-MCNC: 184 MG/DL (ref 70–99)
GLUCOSE BLD-MCNC: 211 MG/DL (ref 70–99)
GLUCOSE BLD-MCNC: 269 MG/DL (ref 70–99)
GLUCOSE BLD-MCNC: 317 MG/DL (ref 70–99)
HCT VFR BLD CALC: 40.6 % (ref 42–52)
HEMOGLOBIN: 13.1 GM/DL (ref 13.5–18)
HIGH SENSITIVE C-REACTIVE PROTEIN: 40.4 MG/L
MCH RBC QN AUTO: 28.5 PG (ref 27–31)
MCHC RBC AUTO-ENTMCNC: 32.3 % (ref 32–36)
MCV RBC AUTO: 88.5 FL (ref 78–100)
PDW BLD-RTO: 12.4 % (ref 11.7–14.9)
PLATELET # BLD: 252 K/CU MM (ref 140–440)
PMV BLD AUTO: 9.9 FL (ref 7.5–11.1)
POTASSIUM SERPL-SCNC: 4.1 MMOL/L (ref 3.5–5.1)
RBC # BLD: 4.59 M/CU MM (ref 4.6–6.2)
SODIUM BLD-SCNC: 140 MMOL/L (ref 135–145)
WBC # BLD: 7.6 K/CU MM (ref 4–10.5)

## 2019-03-18 PROCEDURE — 2500000003 HC RX 250 WO HCPCS: Performed by: FAMILY MEDICINE

## 2019-03-18 PROCEDURE — 86141 C-REACTIVE PROTEIN HS: CPT

## 2019-03-18 PROCEDURE — 97165 OT EVAL LOW COMPLEX 30 MIN: CPT

## 2019-03-18 PROCEDURE — 6370000000 HC RX 637 (ALT 250 FOR IP): Performed by: HOSPITALIST

## 2019-03-18 PROCEDURE — 85027 COMPLETE CBC AUTOMATED: CPT

## 2019-03-18 PROCEDURE — 82962 GLUCOSE BLOOD TEST: CPT

## 2019-03-18 PROCEDURE — 36415 COLL VENOUS BLD VENIPUNCTURE: CPT

## 2019-03-18 PROCEDURE — 99211 OFF/OP EST MAY X REQ PHY/QHP: CPT

## 2019-03-18 PROCEDURE — 80048 BASIC METABOLIC PNL TOTAL CA: CPT

## 2019-03-18 PROCEDURE — 2580000003 HC RX 258: Performed by: FAMILY MEDICINE

## 2019-03-18 PROCEDURE — 6360000002 HC RX W HCPCS: Performed by: HOSPITALIST

## 2019-03-18 PROCEDURE — 6370000000 HC RX 637 (ALT 250 FOR IP): Performed by: FAMILY MEDICINE

## 2019-03-18 PROCEDURE — 6370000000 HC RX 637 (ALT 250 FOR IP): Performed by: NURSE PRACTITIONER

## 2019-03-18 PROCEDURE — 6360000002 HC RX W HCPCS: Performed by: FAMILY MEDICINE

## 2019-03-18 RX ORDER — SULFAMETHOXAZOLE AND TRIMETHOPRIM 800; 160 MG/1; MG/1
1 TABLET ORAL 2 TIMES DAILY
Qty: 22 TABLET | Refills: 0 | Status: SHIPPED | OUTPATIENT
Start: 2019-03-18 | End: 2019-03-29

## 2019-03-18 RX ORDER — OXYCODONE HYDROCHLORIDE AND ACETAMINOPHEN 5; 325 MG/1; MG/1
1 TABLET ORAL EVERY 6 HOURS PRN
Qty: 9 TABLET | Refills: 0 | Status: SHIPPED | OUTPATIENT
Start: 2019-03-18 | End: 2019-03-21

## 2019-03-18 RX ADMIN — MULTIPLE VITAMINS W/ MINERALS TAB 1 TABLET: TAB at 09:20

## 2019-03-18 RX ADMIN — TAZOBACTAM SODIUM AND PIPERACILLIN SODIUM 3.38 G: 375; 3 INJECTION, SOLUTION INTRAVENOUS at 00:04

## 2019-03-18 RX ADMIN — INSULIN LISPRO 5 UNITS: 100 INJECTION, SOLUTION INTRAVENOUS; SUBCUTANEOUS at 12:27

## 2019-03-18 RX ADMIN — OXYCODONE AND ACETAMINOPHEN 1 TABLET: 5; 325 TABLET ORAL at 09:27

## 2019-03-18 RX ADMIN — SODIUM CHLORIDE, PRESERVATIVE FREE 10 ML: 5 INJECTION INTRAVENOUS at 09:28

## 2019-03-18 RX ADMIN — VANCOMYCIN HYDROCHLORIDE 1000 MG: 1 INJECTION, SOLUTION INTRAVENOUS at 11:23

## 2019-03-18 RX ADMIN — VANCOMYCIN HYDROCHLORIDE 1000 MG: 1 INJECTION, SOLUTION INTRAVENOUS at 10:16

## 2019-03-18 RX ADMIN — FAMOTIDINE 20 MG: 10 INJECTION, SOLUTION INTRAVENOUS at 09:20

## 2019-03-18 RX ADMIN — INSULIN LISPRO 2 UNITS: 100 INJECTION, SOLUTION INTRAVENOUS; SUBCUTANEOUS at 09:21

## 2019-03-18 RX ADMIN — OXYCODONE AND ACETAMINOPHEN 1 TABLET: 5; 325 TABLET ORAL at 04:31

## 2019-03-18 RX ADMIN — TAZOBACTAM SODIUM AND PIPERACILLIN SODIUM 3.38 G: 375; 3 INJECTION, SOLUTION INTRAVENOUS at 12:49

## 2019-03-18 RX ADMIN — OXYCODONE AND ACETAMINOPHEN 1 TABLET: 5; 325 TABLET ORAL at 00:04

## 2019-03-18 RX ADMIN — INSULIN LISPRO 6 UNITS: 100 INJECTION, SOLUTION INTRAVENOUS; SUBCUTANEOUS at 12:23

## 2019-03-18 RX ADMIN — TAZOBACTAM SODIUM AND PIPERACILLIN SODIUM 3.38 G: 375; 3 INJECTION, SOLUTION INTRAVENOUS at 05:24

## 2019-03-18 RX ADMIN — ICOSAPENT ETHYL 1 CAPSULE: 1000 CAPSULE ORAL at 09:23

## 2019-03-18 RX ADMIN — INSULIN LISPRO 3 UNITS: 100 INJECTION, SOLUTION INTRAVENOUS; SUBCUTANEOUS at 09:22

## 2019-03-18 RX ADMIN — LISINOPRIL 10 MG: 10 TABLET ORAL at 09:20

## 2019-03-18 ASSESSMENT — PAIN DESCRIPTION - DESCRIPTORS: DESCRIPTORS: THROBBING

## 2019-03-18 ASSESSMENT — PAIN SCALES - GENERAL
PAINLEVEL_OUTOF10: 6
PAINLEVEL_OUTOF10: 0
PAINLEVEL_OUTOF10: 2
PAINLEVEL_OUTOF10: 0
PAINLEVEL_OUTOF10: 2
PAINLEVEL_OUTOF10: 2
PAINLEVEL_OUTOF10: 0
PAINLEVEL_OUTOF10: 5
PAINLEVEL_OUTOF10: 0
PAINLEVEL_OUTOF10: 2

## 2019-03-18 ASSESSMENT — PAIN DESCRIPTION - PAIN TYPE
TYPE: ACUTE PAIN
TYPE: ACUTE PAIN

## 2019-03-18 ASSESSMENT — PAIN DESCRIPTION - ORIENTATION
ORIENTATION: RIGHT
ORIENTATION: RIGHT

## 2019-03-18 ASSESSMENT — PAIN DESCRIPTION - FREQUENCY: FREQUENCY: INTERMITTENT

## 2019-03-18 ASSESSMENT — PAIN DESCRIPTION - LOCATION
LOCATION: ANKLE;FOOT
LOCATION: ANKLE;FOOT

## 2019-03-19 LAB
CULTURE: ABNORMAL
Lab: ABNORMAL
SPECIMEN: ABNORMAL

## 2019-04-15 ENCOUNTER — HOSPITAL ENCOUNTER (OUTPATIENT)
Dept: WOUND CARE | Age: 59
Discharge: HOME | End: 2019-04-15
Payer: COMMERCIAL

## 2019-04-15 VITALS
TEMPERATURE: 97.4 F | DIASTOLIC BLOOD PRESSURE: 93 MMHG | HEART RATE: 79 BPM | SYSTOLIC BLOOD PRESSURE: 150 MMHG | RESPIRATION RATE: 16 BRPM

## 2019-04-15 DIAGNOSIS — E11.621 DIABETIC ULCER OF RIGHT MIDFOOT ASSOCIATED WITH TYPE 2 DIABETES MELLITUS, WITH FAT LAYER EXPOSED (HCC): ICD-10-CM

## 2019-04-15 DIAGNOSIS — L97.412 DIABETIC ULCER OF RIGHT MIDFOOT ASSOCIATED WITH TYPE 2 DIABETES MELLITUS, WITH FAT LAYER EXPOSED (HCC): ICD-10-CM

## 2019-04-15 DIAGNOSIS — L97.512 RIGHT FOOT ULCER, WITH FAT LAYER EXPOSED (HCC): ICD-10-CM

## 2019-04-15 DIAGNOSIS — E11.51 TYPE 2 DIABETES MELLITUS WITH DIABETIC PERIPHERAL ANGIOPATHY WITHOUT GANGRENE, WITHOUT LONG-TERM CURRENT USE OF INSULIN (HCC): Primary | ICD-10-CM

## 2019-04-15 PROCEDURE — 99213 OFFICE O/P EST LOW 20 MIN: CPT

## 2019-04-15 NOTE — PROGRESS NOTES
Wound Care Center Progress Note       Ambrocio Last  AGE: 66 y.o. GENDER: male  : 1960  TODAY'S DATE:  4/15/2019        Subjective:     Chief Complaint   Patient presents with    Wound Check     Right foot          HISTORY of PRESENT ILLNESS     Michael Gallegos is a 66 y.o. male who presents today for wound evaluation of Chronic diabetic ulcer(s) of the right foot sub 5th met head. The ulcer is of mild severity. The underlying cause of the wound is DM. Wound Pain Timing/Severity: none  Quality of pain: N/A  Severity of pain:  0 / 10   Modifying Factors: diabetes and chronic pressure  Associated Signs/Symptoms: none        PAST MEDICAL HISTORY        Diagnosis Date    Diabetes mellitus ([address]     Diabetic ulcer of right midfoot associated with type 2 diabetes mellitus, with fat layer exposed ([address] 10/8/2018    Right foot ulcer, with fat layer exposed ([facility] 10/8/2018       PAST SURGICAL HISTORY    Past Surgical History:   Procedure Laterality Date    FRACTURE SURGERY      left toe and right ankle and right knee       FAMILY HISTORY    Family History   Problem Relation Age of Onset    Diabetes Mother     Other Father        SOCIAL HISTORY    Social History     Tobacco Use    Smoking status: Former Smoker    Smokeless tobacco: Never Used   Substance Use Topics    Alcohol use:  Yes    Drug use: No       ALLERGIES    No Known Allergies    MEDICATIONS    Current Outpatient Medications on File Prior to Encounter   Medication Sig Dispense Refill    linagliptin (TRADJENTA) 5 MG tablet Take 1 tablet by mouth daily 30 tablet 0    VASCEPA 1 g CAPS capsule       lisinopril (PRINIVIL;ZESTRIL) 10 MG tablet       metFORMIN (GLUCOPHAGE) 850 MG tablet Take 850 mg by mouth      simvastatin (ZOCOR) 40 MG tablet Take 40 mg by mouth      Multiple Vitamins-Minerals (THERAPEUTIC MULTIVITAMIN-MINERALS) tablet Take 1 tablet by mouth daily      gabapentin (NEURONTIN) 400 MG capsule Take 800 mg by mouth every 12 hours as needed. .       No current facility-administered medications on file prior to encounter. REVIEW OF SYSTEMS    Pertinent items are noted in HPI. Constitutional: Negative for systemic symptoms including fever, chills and malaise. Objective:      BP (!) 150/93   Pulse 79   Temp 97.4 °F (36.3 °C) (Temporal)   Resp 16     PHYSICAL EXAM    General: The patient is in no acute distress. Mental status:  Patient is appropriate, is  oriented to place and plan of care. Dermatologic exam: Visual inspection of the periwound reveals the skin to be normal in turgor and texture. Wound exam:  see wound description below     All active wounds listed below with today's date are evaluated      Wound 10/08/18 #1 right lateral Plantar foot DM morejon 2 (Active)   Wound Image   4/15/2019  8:08 AM   Wound Diabetic More[place] 2 4/15/2019  8:08 AM   Dressing Status Clean;Dry; Intact 4/15/2019  8:40 AM   Dressing Changed Changed/New 4/15/2019  8:40 AM   Dressing/Treatment Ace Wrap;Dry Dressing;Packing 3/18/2019  5:00 AM   Wound Cleansed Rinsed/Irrigated with saline 4/15/2019  8:08 AM   Wound Length (cm) 0 cm 4/15/2019  8:08 AM   Wound Width (cm) 0 cm 4/15/2019  8:08 AM   Wound Depth (cm) 0 cm 4/15/2019  8:08 AM   Wound Surface Area (cm^2) 0 cm^2 4/15/2019  8:08 AM   Change in Wound Size % (l*w) 100 4/15/2019  8:08 AM   Wound Volume (cm^3) 0 cm^3 4/15/2019  8:08 AM   Wound Healing % 100 4/15/2019  8:08 AM   Post-Procedure Length (cm) 0 cm 3/18/2019  1:03 PM   Post-Procedure Width (cm) 0 cm 3/18/2019  1:03 PM   Post-Procedure Depth (cm) 0 cm 3/18/2019  1:03 PM   Post-Procedure Surface Area (cm^2) 0 cm^2 3/18/2019  1:03 PM   Post-Procedure Volume (cm^3) 0 cm^3 3/18/2019  1:03 PM   Distance Tunneling (cm) 0 cm 4/15/2019  8:08 AM   Tunneling Position ___ O'Clock 0 4/15/2019  8:08 AM   Undermining Starts ___ O'Clock 0 4/15/2019  8:08 AM   Undermining Ends___ O'Clock 0 4/15/2019  8:08 AM   Undermining Maxium Distance (cm) 0 4/15/2019  8:08 AM   Wound Assessment Clean;Dry; Intact 4/15/2019  8:08 AM   Drainage Amount None 4/15/2019  8:08 AM   Drainage Description Serosanguinous 3/18/2019  1:03 PM   Odor None 4/15/2019  8:08 AM   Margins Defined edges 4/15/2019  8:08 AM   Cathie-wound Assessment Calloused 4/15/2019  8:08 AM   Non-staged Wound Description Not applicable 1/84/9703  4:22 AM   [place]%Wound Bed 0 4/15/2019  8:08 AM   Red%Wound Bed 100 4/15/2019  8:08 AM   Yellow%Wound Bed 0 4/15/2019  8:08 AM   Black%Wound Bed 0 4/15/2019  8:08 AM   Purple%Wound Bed 0 4/15/2019  8:08 AM   Other%Wound Bed 0 4/15/2019  8:08 AM   Number of days: 189       Wound 03/18/19 #2 right lateral foot (Active)   Wound Image   4/15/2019  8:08 AM   Dressing Status Clean;Dry; Intact 4/15/2019  8:40 AM   Dressing Changed Changed/New 4/15/2019  8:40 AM   Wound Cleansed Rinsed/Irrigated with saline 4/15/2019  8:08 AM   Wound Length (cm) 0 cm 4/15/2019  8:08 AM   Wound Width (cm) 0 cm 4/15/2019  8:08 AM   Wound Depth (cm) 0 cm 4/15/2019  8:08 AM   Wound Surface Area (cm^2) 0 cm^2 4/15/2019  8:08 AM   Change in Wound Size % (l*w) 100 4/15/2019  8:08 AM   Wound Volume (cm^3) 0 cm^3 4/15/2019  8:08 AM   Wound Healing % 100 4/15/2019  8:08 AM   Post-Procedure Length (cm) 0 cm 3/18/2019  1:03 PM   Post-Procedure Width (cm) 0 cm 3/18/2019  1:03 PM   Post-Procedure Depth (cm) 0 cm 3/18/2019  1:03 PM   Post-Procedure Surface Area (cm^2) 0 cm^2 3/18/2019  1:03 PM   Post-Procedure Volume (cm^3) 0 cm^3 3/18/2019  1:03 PM   Distance Tunneling (cm) 0 cm 4/15/2019  8:08 AM   Tunneling Position ___ O'Clock 0 4/15/2019  8:08 AM   Undermining Starts ___ O'Clock 0 4/15/2019  8:08 AM   Undermining Ends___ O'Clock 0 4/15/2019  8:08 AM   Undermining Maxium Distance (cm) 0 4/15/2019  8:08 AM   Wound Assessment Red 4/15/2019  8:08 AM   Drainage Amount None 4/15/2019  8:08 AM   Drainage Description Serosanguinous 3/18/2019  1:03 PM   Odor None 4/15/2019  8:08 AM   Cathie-wound Assessment                         WB not scrub or use excessive force.                          Wash hands with soap and water before and after dressing changes.                         Prior to applying a clean dressing, cleanse wound with normal saline,                          wound cleanser, or mild soap and water.                           Ask your physician or nurse before getting the wound(s) wet in the shower.              Daily Wound management:                          Keep weight off wounds and reposition every 2 hours.                          Avoid standing for long periods of time.                          Apply wraps/stockings in AM and remove at bedtime.                          Elevate legs to the level of the heart or above for 30 minutes 4-5 times a day and/or when sitting.                                               When taking antibiotics take entire prescription as ordered by MD do not stop taking until medicine is all gone.                                                                 Orders for this week: 4/15/19                     . [employer]:  If you have questions or need to reorder your supplies please contact our [employer] representative Nitza at .       Right lateral planter- wash with soap and water daily. Pat dry.  Apply damp fibracol to wound bed, cover with mepilex border. Change daily.     STAY OFF RIGHT FOOT. COMPLETE OFFLOADING. PATIENT TO USE CRUTCHES      Script for offloading shoes given 4/15/19    Follow up with Dr. Ella Nava one month in the wound care center.     Call [phone] for any questions or concerns.   Date__________   Time_________________                                      Treatment Note Wound 10/08/18 #1 right lateral Plantar foot DM tobar 2-Dressing/Treatment: (fibracol, mepilex border)  Wound 03/18/19 #2 right lateral foot-Dressing/Treatment: (fibracol, mepilex border)    Written Patient Dismissal Instructions Given Electronically signed by Jese Segovia DPM on 4/15/2019 at 8:46 AM

## 2019-05-13 ENCOUNTER — HOSPITAL ENCOUNTER (OUTPATIENT)
Dept: WOUND CARE | Age: 59
Discharge: HOME | End: 2019-05-13
Payer: COMMERCIAL

## 2019-05-13 VITALS — RESPIRATION RATE: 16 BRPM | TEMPERATURE: 97.7 F

## 2019-05-13 DIAGNOSIS — E11.621 DIABETIC ULCER OF RIGHT MIDFOOT ASSOCIATED WITH TYPE 2 DIABETES MELLITUS, WITH FAT LAYER EXPOSED (HCC): Primary | ICD-10-CM

## 2019-05-13 DIAGNOSIS — L97.412 DIABETIC ULCER OF RIGHT MIDFOOT ASSOCIATED WITH TYPE 2 DIABETES MELLITUS, WITH FAT LAYER EXPOSED (HCC): Primary | ICD-10-CM

## 2019-05-13 PROCEDURE — 99212 OFFICE O/P EST SF 10 MIN: CPT

## 2019-05-13 PROCEDURE — 99212 OFFICE O/P EST SF 10 MIN: CPT | Performed by: NURSE PRACTITIONER

## 2019-05-13 NOTE — PROGRESS NOTES
Wound Care Center Progress Note       Ambrocio Last  AGE: 66 y.o. GENDER: male  : 1960  TODAY'S DATE:  2019        Subjective:     Chief Complaint   Patient presents with    Wound Check     right foot          HISTORY of PRESENT ILLNESS     Julito Abdi is a 66 y.o. male who presents today for wound evaluation of Chronic diabetic ulcer(s) of right foot sub 5th met head. The ulcer is of mild severity. The underlying cause of the wound is diabetes. Wound is healed today. He has been instructed to follow up with Dr. Marlene Mcqueen in his office within one month for further management of his feet and callus. Wound Pain Timing/Severity: none  Quality of pain: N/A  Severity of pain:  0 / 10   Modifying Factors: diabetes and chronic pressure  Associated Signs/Symptoms: none        PAST MEDICAL HISTORY        Diagnosis Date    Diabetes mellitus ([address]     Diabetic ulcer of right midfoot associated with type 2 diabetes mellitus, with fat layer exposed ([address] 10/8/2018    Right foot ulcer, with fat layer exposed ([address] 10/8/2018       PAST SURGICAL HISTORY    Past Surgical History:   Procedure Laterality Date    FRACTURE SURGERY      left toe and right ankle and right knee       FAMILY HISTORY    Family History   Problem Relation Age of Onset    Diabetes Mother     Other Father        SOCIAL HISTORY    Social History     Tobacco Use    Smoking status: Former Smoker    Smokeless tobacco: Never Used   Substance Use Topics    Alcohol use:  Yes    Drug use: No       ALLERGIES    No Known Allergies    MEDICATIONS    Current Outpatient Medications on File Prior to Encounter   Medication Sig Dispense Refill    linagliptin (TRADJENTA) 5 MG tablet Take 1 tablet by mouth daily 30 tablet 0    VASCEPA 1 g CAPS capsule       lisinopril (PRINIVIL;ZESTRIL) 10 MG tablet       metFORMIN (GLUCOPHAGE) 850 MG tablet Take 850 mg by mouth      simvastatin (ZOCOR) 40 MG tablet Take 40 mg by mouth      Multiple Vitamins-Minerals (THERAPEUTIC MULTIVITAMIN-MINERALS) tablet Take 1 tablet by mouth daily      gabapentin (NEURONTIN) 400 MG capsule Take 800 mg by mouth every 12 hours as needed. .       No current facility-administered medications on file prior to encounter. REVIEW OF SYSTEMS    Pertinent items are noted in HPI. Constitutional: Negative for systemic symptoms including fever, chills and malaise. Objective:      Temp 97.7 °F (36.5 °C) (Temporal)   Resp 16     PHYSICAL EXAM    General: The patient is in no acute distress. Mental status:  Patient is appropriate, is  oriented to place and plan of care. Dermatologic exam: Visual inspection of the periwound reveals the skin to be normal in turgor and texture. Wound exam:  see wound description below     All active wounds listed below with today's date are evaluated           Assessment:       Problem List Items Addressed This Visit     Diabetic ulcer of right midfoot associated with type 2 diabetes mellitus, with fat layer exposed ([facility] [address] - Primary          Status of wound progress and description from last visit:   Healed, discharge from wound clinic. Plan:     Discharge Instructions         Discharge Instructions        .PHYSICIAN ORDERS AND DISCHARGE INSTRUCTIONS     NOTE: Upon discharge from the [address], you will receive a patient experience survey. We would be grateful if you would take the time to fill this survey out.     Wound care order history:                 VIVI's   Right   0.93    Left 0.93            Date 10/8/18              Vascular studies:   Date               Imaging:   Date               Cultures:   Date               Labs/ HbA1c:   Date               Grafts:  Date               HBO:  n/a               Antibiotics: gentamycin. 10/8 bactrim.                Earlier Wound care treatments:  Gent, salt and vinager soaks              Authorizations:                        Consults:   Date                           PCP:  Yovanny     Continuing wound care orders and information:              Residence: home              Continue home health care with: n/a              Your wound-care supplies will be provided by: .Александр ROBLEDO provider:              Compression with              XMW loading:  Date               VQCPJ Meds:              CLEVE cleansing:                           TT not scrub or use excessive force.                          Wash hands with soap and water before and after dressing changes.                         Prior to applying a clean dressing, cleanse wound with normal saline,                          wound cleanser, or mild soap and water.                           Ask your physician or nurse before getting the wound(s) wet in the shower.              Daily Wound management:                          Keep weight off wounds and reposition every 2 hours.                          Avoid standing for long periods of time.                          Apply wraps/stockings in AM and remove at bedtime.                          Elevate legs to the level of the heart or above for 30 minutes 4-5 times a day and/or when sitting.                                               When taking antibiotics take entire prescription as ordered by MD do not stop taking until medicine is all gone.                                                                 Orders for this week: 5/13/19                   Right lateral planter- healed no dressing needed       Script for offloading shoes given 4/15/19    Check feet daily     Discharge from wound clinic follow up in Dr Tiara Schmitt office  In 3-4 weeks     Call [phone] for any questions or concerns.   Date__________   Time_________________          Treatment Note      Written Patient Dismissal Instructions Given            Electronically signed by DARLINE Hobson CNP on 5/13/2019 at 8:32 AM

## 2019-06-10 ENCOUNTER — HOSPITAL ENCOUNTER (OUTPATIENT)
Dept: WOUND CARE | Age: 59
Discharge: HOME | End: 2019-06-10
Payer: COMMERCIAL

## 2019-06-10 VITALS
WEIGHT: 277 LBS | TEMPERATURE: 97.6 F | HEART RATE: 76 BPM | BODY MASS INDEX: 35.56 KG/M2 | RESPIRATION RATE: 16 BRPM | SYSTOLIC BLOOD PRESSURE: 127 MMHG | DIASTOLIC BLOOD PRESSURE: 86 MMHG

## 2019-06-10 DIAGNOSIS — E11.621 DIABETIC ULCER OF RIGHT MIDFOOT ASSOCIATED WITH TYPE 2 DIABETES MELLITUS, WITH FAT LAYER EXPOSED (HCC): ICD-10-CM

## 2019-06-10 DIAGNOSIS — L97.512 RIGHT FOOT ULCER, WITH FAT LAYER EXPOSED (HCC): ICD-10-CM

## 2019-06-10 DIAGNOSIS — L97.412 DIABETIC ULCER OF RIGHT MIDFOOT ASSOCIATED WITH TYPE 2 DIABETES MELLITUS, WITH FAT LAYER EXPOSED (HCC): ICD-10-CM

## 2019-06-10 PROCEDURE — 99213 OFFICE O/P EST LOW 20 MIN: CPT

## 2019-06-10 NOTE — PROGRESS NOTES
Wound Care Center Progress Note       Ambrocio Last  AGE: 66 y.o. GENDER: male  : 1960  TODAY'S DATE:  6/10/2019        Subjective:     Chief Complaint   Patient presents with    Wound Check     left foot         HISTORY of PRESENT ILLNESS     Zeeshan Morgan is a 66 y.o. male who presents today for wound evaluation of Chronic diabetic ulcer(s) of right foot sub 5th met head. WOund is a recurrence. The ulcer is of moderate severity. The underlying cause of the wound is DM and pressure. Pt observed walking into clinic, knowing that he needs to stay off of the foot. Wound Pain Timing/Severity: none  Quality of pain: N/A  Severity of pain:  0 / 10   Modifying Factors: diabetes, chronic pressure and non-adherence  Associated Signs/Symptoms: none        PAST MEDICAL HISTORY        Diagnosis Date    Diabetes mellitus ([address]     Diabetic ulcer of right midfoot associated with type 2 diabetes mellitus, with fat layer exposed ([address] 10/8/2018    Right foot ulcer, with fat layer exposed ([address] 10/8/2018       PAST SURGICAL HISTORY    Past Surgical History:   Procedure Laterality Date    FRACTURE SURGERY      left toe and right ankle and right knee       FAMILY HISTORY    Family History   Problem Relation Age of Onset    Diabetes Mother     Other Father        SOCIAL HISTORY    Social History     Tobacco Use    Smoking status: Former Smoker    Smokeless tobacco: Never Used   Substance Use Topics    Alcohol use:  Yes    Drug use: No       ALLERGIES    No Known Allergies    MEDICATIONS    Current Outpatient Medications on File Prior to Encounter   Medication Sig Dispense Refill    VASCEPA 1 g CAPS capsule       lisinopril (PRINIVIL;ZESTRIL) 10 MG tablet       metFORMIN (GLUCOPHAGE) 850 MG tablet Take 850 mg by mouth      simvastatin (ZOCOR) 40 MG tablet Take 40 mg by mouth      Multiple Vitamins-Minerals (THERAPEUTIC MULTIVITAMIN-MINERALS) tablet Take 1 tablet by mouth daily      gabapentin (NEURONTIN) 400 MG capsule Take 800 mg by mouth every 12 hours as needed. .       No current facility-administered medications on file prior to encounter. REVIEW OF SYSTEMS    Pertinent items are noted in HPI. Constitutional: Negative for systemic symptoms including fever, chills and malaise. Objective:      /86   Pulse 76   Temp 97.6 °F (36.4 °C) (Temporal)   Resp 16   Wt 277 lb (125.6 kg)   BMI 35.56 kg/m²     PHYSICAL EXAM    General: The patient is in no acute distress. Mental status:  Patient is appropriate, is  oriented to place and plan of care. Dermatologic exam: Visual inspection of the periwound reveals the skin to be edematous. Wound exam:  see wound description below     All active wounds listed below with today's date are evaluated      Wound 03/18/19 #2 (onset 2 days) Right Lateral Foot (Active)   Wound Image   6/10/2019  2:08 PM   Dressing Status Clean;Dry; Intact 6/10/2019  3:03 PM   Dressing Changed Changed/New 6/10/2019  3:03 PM   Wound Cleansed Rinsed/Irrigated with saline 6/10/2019  2:08 PM   Wound Length (cm) 1.3 cm 6/10/2019  2:08 PM   Wound Width (cm) 1.4 cm 6/10/2019  2:08 PM   Wound Depth (cm) 0.1 cm 6/10/2019  2:08 PM   Wound Surface Area (cm^2) 1.82 cm^2 6/10/2019  2:08 PM   Change in Wound Size % (l*w) -152.78 6/10/2019  2:08 PM   Wound Volume (cm^3) 0.18 cm^3 6/10/2019  2:08 PM   Wound Healing % 64 6/10/2019  2:08 PM   Distance Tunneling (cm) 0 cm 6/10/2019  2:08 PM   Tunneling Position ___ O'Clock 0 6/10/2019  2:08 PM   Undermining Starts ___ O'Clock 0 6/10/2019  2:08 PM   Undermining Ends___ O'Clock 0 6/10/2019  2:08 PM   Undermining Maxium Distance (cm) 0 6/10/2019  2:08 PM   Wound Assessment Pink 6/10/2019  2:08 PM   Drainage Amount Moderate 6/10/2019  2:08 PM   Drainage Description Serosanguinous 6/10/2019  2:08 PM   Odor None 6/10/2019  2:08 PM   Margins Defined edges; Unattached edges 6/10/2019  2:08 PM   Cathie-wound Assessment Pink 6/10/2019  2:08 PM Meds:              EKFRR cleansing:                           EF not scrub or use excessive force.                          Wash hands with soap and water before and after dressing changes.                         Prior to applying a clean dressing, cleanse wound with normal saline,                          wound cleanser, or mild soap and water.                           Ask your physician or nurse before getting the wound(s) wet in the shower.              Daily Wound management:                          Keep weight off wounds and reposition every 2 hours.                          Avoid standing for long periods of time.                          Apply wraps/stockings in AM and remove at bedtime.                          Elevate legs to the level of the heart or above for 30 minutes 4-5 times a day and/or when sitting.                                               When taking antibiotics take entire prescription as ordered by MD do not stop taking until medicine is all gone.                                                                 Orders for this week: 6/10/19                   Right lateral planter- wash with soap and water daily. Pat dry. Apply medihoney to wound bed, cover with mepilex border. Change daily      Script for offloading shoes given 4/15/19- received      Check feet daily     Return in one week for follow up on Monday on 6/17/19    Call [phone] for any questions or concerns.   Date__________   Time_________________                                Treatment Note Wound 03/18/19 #2 (onset 2 days) Right Lateral Foot-Dressing/Treatment: (medihoney, mepilex border)    Written Patient Dismissal Instructions Given            Electronically signed by Stevie Horton DPM on 6/10/2019 at 3:21 PM

## 2019-06-17 ENCOUNTER — HOSPITAL ENCOUNTER (OUTPATIENT)
Dept: WOUND CARE | Age: 59
Discharge: HOME | End: 2019-06-17

## 2019-06-24 ENCOUNTER — HOSPITAL ENCOUNTER (OUTPATIENT)
Dept: WOUND CARE | Age: 59
Discharge: HOME | End: 2019-06-24
Payer: COMMERCIAL

## 2019-06-24 VITALS
SYSTOLIC BLOOD PRESSURE: 126 MMHG | HEART RATE: 80 BPM | RESPIRATION RATE: 16 BRPM | DIASTOLIC BLOOD PRESSURE: 86 MMHG | TEMPERATURE: 97.6 F

## 2019-06-24 DIAGNOSIS — E11.51 TYPE 2 DIABETES MELLITUS WITH DIABETIC PERIPHERAL ANGIOPATHY WITHOUT GANGRENE, WITHOUT LONG-TERM CURRENT USE OF INSULIN (HCC): ICD-10-CM

## 2019-06-24 DIAGNOSIS — E11.621 DIABETIC ULCER OF RIGHT MIDFOOT ASSOCIATED WITH TYPE 2 DIABETES MELLITUS, WITH FAT LAYER EXPOSED (HCC): ICD-10-CM

## 2019-06-24 DIAGNOSIS — L97.512 RIGHT FOOT ULCER, WITH FAT LAYER EXPOSED (HCC): Primary | ICD-10-CM

## 2019-06-24 DIAGNOSIS — L97.412 DIABETIC ULCER OF RIGHT MIDFOOT ASSOCIATED WITH TYPE 2 DIABETES MELLITUS, WITH FAT LAYER EXPOSED (HCC): ICD-10-CM

## 2019-06-24 PROCEDURE — 11042 DBRDMT SUBQ TIS 1ST 20SQCM/<: CPT

## 2019-06-24 ASSESSMENT — PAIN DESCRIPTION - PROGRESSION: CLINICAL_PROGRESSION: NOT CHANGED

## 2019-06-24 NOTE — PROGRESS NOTES
Wound Care Center Progress Note With Procedure    Ambrocio Last  AGE: 66 y.o. GENDER: male  : 1960  EPISODE DATE:  2019     Subjective:     Chief Complaint   Patient presents with    Wound Check     Right foot          HISTORY of PRESENT ILLNESS      Vilinda Kehr is a 66 y.o. male who presents today for wound evaluation of Chronic diabetic ulcer(s) of the sub 5th met head right foot. The ulcer is of mild severity. The underlying cause of the wound is DM. Wound Pain Timing/Severity: none  Quality of pain: N/A  Severity of pain:  0 / 10   Modifying Factors: diabetes, chronic pressure and non-adherence  Associated Signs/Symptoms: none        PAST MEDICAL HISTORY        Diagnosis Date    Diabetes mellitus ([address]     Diabetic ulcer of right midfoot associated with type 2 diabetes mellitus, with fat layer exposed ([address] 10/8/2018    Right foot ulcer, with fat layer exposed ([address] 10/8/2018       PAST SURGICAL HISTORY    Past Surgical History:   Procedure Laterality Date    FRACTURE SURGERY      left toe and right ankle and right knee       FAMILY HISTORY    Family History   Problem Relation Age of Onset    Diabetes Mother     Other Father        SOCIAL HISTORY    Social History     Tobacco Use    Smoking status: Former Smoker    Smokeless tobacco: Never Used   Substance Use Topics    Alcohol use: Yes    Drug use: No       ALLERGIES    No Known Allergies    MEDICATIONS    Current Outpatient Medications on File Prior to Encounter   Medication Sig Dispense Refill    VASCEPA 1 g CAPS capsule       lisinopril (PRINIVIL;ZESTRIL) 10 MG tablet       metFORMIN (GLUCOPHAGE) 850 MG tablet Take 850 mg by mouth      simvastatin (ZOCOR) 40 MG tablet Take 40 mg by mouth      Multiple Vitamins-Minerals (THERAPEUTIC MULTIVITAMIN-MINERALS) tablet Take 1 tablet by mouth daily      gabapentin (NEURONTIN) 400 MG capsule Take 800 mg by mouth every 12 hours as needed. .       No current Wound Cleansed Rinsed/Irrigated with saline 6/24/2019  8:16 AM   Wound Length (cm) 0.9 cm 6/24/2019  8:16 AM   Wound Width (cm) 0.9 cm 6/24/2019  8:16 AM   Wound Depth (cm) 0.1 cm 6/24/2019  8:16 AM   Wound Surface Area (cm^2) 0.81 cm^2 6/24/2019  8:16 AM   Change in Wound Size % (l*w) -12.5 6/24/2019  8:16 AM   Wound Volume (cm^3) 0.08 cm^3 6/24/2019  8:16 AM   Wound Healing % 84 6/24/2019  8:16 AM   Post-Procedure Length (cm) 0.9 cm 6/24/2019  8:20 AM   Post-Procedure Width (cm) 0.9 cm 6/24/2019  8:20 AM   Post-Procedure Depth (cm) 0.1 cm 6/24/2019  8:20 AM   Post-Procedure Surface Area (cm^2) 0.81 cm^2 6/24/2019  8:20 AM   Post-Procedure Volume (cm^3) 0.08 cm^3 6/24/2019  8:20 AM   Distance Tunneling (cm) 0 cm 6/24/2019  8:16 AM   Tunneling Position ___ O'Clock 0 6/24/2019  8:16 AM   Undermining Starts ___ O'Clock 0 6/24/2019  8:16 AM   Undermining Ends___ O'Clock 0 6/24/2019  8:16 AM   Undermining Maxium Distance (cm) 0 6/24/2019  8:16 AM   Wound Assessment Pink 6/24/2019  8:16 AM   Drainage Amount Moderate 6/24/2019  8:16 AM   Drainage Description Serosanguinous 6/24/2019  8:16 AM   Odor None 6/24/2019  8:16 AM   Margins Defined edges 6/24/2019  8:16 AM   Cathie-wound Assessment Pink 6/24/2019  8:16 AM   Non-staged Wound Description Full thickness 6/24/2019  8:16 AM   [place]%Wound Bed 100 6/24/2019  8:16 AM   Red%Wound Bed 0 6/24/2019  8:16 AM   Yellow%Wound Bed 0 6/24/2019  8:16 AM   Black%Wound Bed 0 6/24/2019  8:16 AM   Purple%Wound Bed 0 6/24/2019  8:16 AM   Other%Wound Bed 0 6/24/2019  8:16 AM   Number of days: 97       Percent of Wound(s) Debrided: approximately 100%    Total  Area  Debrided:  0.81 sq cm     Bleeding:  Minimal    Hemostasis Achieved:  by pressure    Procedural Pain:  0  / 10     Post Procedural Pain:  0 / 10     Response to treatment:  Well tolerated by patient. Status of wound progress and description from last visit:   Moderately improved.       Plan:       Discharge Instructions Plan:      Discharge Instructions           Discharge Instructions        .PHYSICIAN ORDERS AND DISCHARGE INSTRUCTIONS     NOTE: Upon discharge from the [address], you will receive a patient experience survey. We would be grateful if you would take the time to fill this survey out.     Wound care order history:                 VIVI's   Right   0.93    Left 0.93            Date 10/8/18              Vascular studies:   Date               Imaging:   Date               Cultures:   Date               Labs/ HbA1c:   Date               Grafts:  Date               HBO:  n/a               Antibiotics: gentamycin. 10/8 bactrim.             Earlier Wound care treatments:  Gent, salt and vinager soaks              Authorizations:                        Consults:   Date                           PCP: Dr. Julia Mendoza care orders and information:              Residence: home              Continue home health care with: n/a              Your wound-care supplies will be provided by: Francoise ROBLEDO provider:              Compression with              MJT loading:  Date               DLXDO Meds:              YRQFB cleansing:                           XW not scrub or use excessive force.                          Wash hands with soap and water before and after dressing changes.                           Prior to applying a clean dressing, cleanse wound with normal saline,                          wound cleanser, or mild soap and water.                           Ask your physician or nurse before getting the wound(s) wet in the shower.              Daily Wound management:                          Keep weight off wounds and reposition every 2 hours.                          Avoid standing for long periods of time.                          Apply wraps/stockings in AM and remove at bedtime.                          Elevate legs to the level of the heart or above for 30 minutes 4-5 times a day and/or when sitting.                                               SZQH taking antibiotics take entire prescription as ordered by MD do not stop taking until medicine is all gone.                                                                 Orders for this week: 6/24/19                   Right lateral planter- wash with soap and water daily. Pat dry. Apply medihoney to wound bed, cover with mepilex border. Change daily      Script for offloading shoes given 4/15/19- received      Check feet daily     Return in one week for follow up on Monday on 6/17/19     Call [phone] for any questions or concerns.   Date__________   Time_________________                          Treatment Note Wound 03/18/19 #4(onset 2 days) Right Lateral Foot ( tobar 2)-Dressing/Treatment: (medihoney, mepilex border)    Written Patient Dismissal Instructions Given            Electronically signed by Adelia Meigs, DPM on 6/24/2019 at 9:26 AM

## 2019-07-08 ENCOUNTER — HOSPITAL ENCOUNTER (OUTPATIENT)
Dept: WOUND CARE | Age: 59
Discharge: HOME | End: 2019-07-08
Payer: COMMERCIAL

## 2019-07-08 VITALS
RESPIRATION RATE: 18 BRPM | DIASTOLIC BLOOD PRESSURE: 80 MMHG | SYSTOLIC BLOOD PRESSURE: 138 MMHG | TEMPERATURE: 97.6 F | HEART RATE: 78 BPM

## 2019-07-08 DIAGNOSIS — L97.512 RIGHT FOOT ULCER, WITH FAT LAYER EXPOSED (HCC): Primary | ICD-10-CM

## 2019-07-08 DIAGNOSIS — E11.621 DIABETIC ULCER OF RIGHT MIDFOOT ASSOCIATED WITH TYPE 2 DIABETES MELLITUS, WITH FAT LAYER EXPOSED (HCC): ICD-10-CM

## 2019-07-08 DIAGNOSIS — L97.412 DIABETIC ULCER OF RIGHT MIDFOOT ASSOCIATED WITH TYPE 2 DIABETES MELLITUS, WITH FAT LAYER EXPOSED (HCC): ICD-10-CM

## 2019-07-08 DIAGNOSIS — E11.51 TYPE 2 DIABETES MELLITUS WITH DIABETIC PERIPHERAL ANGIOPATHY WITHOUT GANGRENE, WITHOUT LONG-TERM CURRENT USE OF INSULIN (HCC): ICD-10-CM

## 2019-07-08 PROCEDURE — 11042 DBRDMT SUBQ TIS 1ST 20SQCM/<: CPT

## 2019-07-08 ASSESSMENT — PAIN DESCRIPTION - PROGRESSION: CLINICAL_PROGRESSION: NOT CHANGED

## 2019-07-08 NOTE — PROGRESS NOTES
Wound Care Center Progress Note With Procedure    Ambrocio Last  AGE: 66 y.o. GENDER: male  : 1960  EPISODE DATE:  2019     Subjective:     Chief Complaint   Patient presents with    Wound Check     Right foot          HISTORY of PRESENT ILLNESS      Sheryl Curtis is a 66 y.o. male who presents today for wound evaluation of Chronic diabetic ulcer(s) of the right foot. The ulcer is of mild severity. The underlying cause of the wound is DM. Wound Pain Timing/Severity: none  Quality of pain: N/A  Severity of pain:  0 / 10   Modifying Factors: diabetes  Associated Signs/Symptoms: none        PAST MEDICAL HISTORY        Diagnosis Date    Diabetes mellitus ([address]     Diabetic ulcer of right midfoot associated with type 2 diabetes mellitus, with fat layer exposed ([facility] 10/8/2018    Right foot ulcer, with fat layer exposed ([facility] 10/8/2018       PAST SURGICAL HISTORY    Past Surgical History:   Procedure Laterality Date    FRACTURE SURGERY      left toe and right ankle and right knee       FAMILY HISTORY    Family History   Problem Relation Age of Onset    Diabetes Mother     Other Father        SOCIAL HISTORY    Social History     Tobacco Use    Smoking status: Former Smoker    Smokeless tobacco: Never Used   Substance Use Topics    Alcohol use: Yes    Drug use: No       ALLERGIES    No Known Allergies    MEDICATIONS    Current Outpatient Medications on File Prior to Encounter   Medication Sig Dispense Refill    VASCEPA 1 g CAPS capsule       lisinopril (PRINIVIL;ZESTRIL) 10 MG tablet       metFORMIN (GLUCOPHAGE) 850 MG tablet Take 850 mg by mouth      simvastatin (ZOCOR) 40 MG tablet Take 40 mg by mouth      Multiple Vitamins-Minerals (THERAPEUTIC MULTIVITAMIN-MINERALS) tablet Take 1 tablet by mouth daily      gabapentin (NEURONTIN) 400 MG capsule Take 800 mg by mouth every 12 hours as needed. .       No current facility-administered medications on file prior to encounter. 7/8/2019  8:37 AM   Wound Width (cm) 0.3 cm 7/8/2019  8:37 AM   Wound Depth (cm) 0.1 cm 7/8/2019  8:37 AM   Wound Surface Area (cm^2) 0.12 cm^2 7/8/2019  8:37 AM   Change in Wound Size % (l*w) 83.33 7/8/2019  8:37 AM   Wound Volume (cm^3) 0.01 cm^3 7/8/2019  8:37 AM   Wound Healing % 98 7/8/2019  8:37 AM   Post-Procedure Length (cm) 0.9 cm 6/24/2019  8:20 AM   Post-Procedure Width (cm) 0.9 cm 6/24/2019  8:20 AM   Post-Procedure Depth (cm) 0.1 cm 6/24/2019  8:20 AM   Post-Procedure Surface Area (cm^2) 0.81 cm^2 6/24/2019  8:20 AM   Post-Procedure Volume (cm^3) 0.08 cm^3 6/24/2019  8:20 AM   Distance Tunneling (cm) 0 cm 7/8/2019  8:37 AM   Tunneling Position ___ O'Clock 0 7/8/2019  8:37 AM   Undermining Starts ___ O'Clock 0 7/8/2019  8:37 AM   Undermining Ends___ O'Clock 0 7/8/2019  8:37 AM   Undermining Maxium Distance (cm) 0 7/8/2019  8:37 AM   Wound Assessment Pink 7/8/2019  8:37 AM   Drainage Amount Small 7/8/2019  8:37 AM   Drainage Description Serosanguinous 7/8/2019  8:37 AM   Odor None 7/8/2019  8:37 AM   Margins Defined edges 7/8/2019  8:37 AM   Cathie-wound Assessment Call[place];Pink 7/8/2019  8:37 AM   Non-staged Wound Description Full thickness 7/8/2019  8:37 AM   [place]%Wound Bed 100 7/8/2019  8:37 AM   Red%Wound Bed 0 7/8/2019  8:37 AM   Yellow%Wound Bed 0 7/8/2019  8:37 AM   Black%Wound Bed 0 7/8/2019  8:37 AM   Purple%Wound Bed 0 7/8/2019  8:37 AM   Other%Wound Bed 0 7/8/2019  8:37 AM   Number of days: 111       Percent of Wound(s) Debrided: approximately 100%    Total  Area  Debrided:  0.81 sq cm     Bleeding:  Minimal    Hemostasis Achieved:  by pressure    Procedural Pain:  0  / 10     Post Procedural Pain:  0 / 10     Response to treatment:  Well tolerated by patient. Status of wound progress and description from last visit:   Moderately improved.       Plan:       Discharge Instructions         Plan:      Discharge Instructions           Discharge Instructions        .PHYSICIAN ORDERS AND DISCHARGE

## 2019-07-08 NOTE — PLAN OF CARE
Problem: Wound:  Goal: Will show signs of wound healing; wound closure and no evidence of infection  Description  Will show signs of wound healing; wound closure and no evidence of infection   Outcome: Ongoing     Problem: Wound:  Intervention: Assess pain status  Note:   See flowsheet  Intervention: Assess wound size, appearance and drainage  Note:   See flowsheet  Intervention: Assess pedal pulses bilaterally if patient has a foot or leg ulcer  Note:   See flowsheet

## 2019-08-01 ENCOUNTER — HOSPITAL ENCOUNTER (OUTPATIENT)
Dept: WOUND CARE | Age: 59
Discharge: HOME | End: 2019-08-01
Payer: COMMERCIAL

## 2019-08-01 VITALS
DIASTOLIC BLOOD PRESSURE: 79 MMHG | TEMPERATURE: 96.7 F | SYSTOLIC BLOOD PRESSURE: 122 MMHG | RESPIRATION RATE: 16 BRPM | HEART RATE: 70 BPM

## 2019-08-01 DIAGNOSIS — L08.89 OTHER SPECIFIED LOCAL INFECTIONS OF THE SKIN AND SUBCUTANEOUS TISSUE: ICD-10-CM

## 2019-08-01 DIAGNOSIS — E11.621 DIABETIC ULCER OF RIGHT MIDFOOT ASSOCIATED WITH TYPE 2 DIABETES MELLITUS, WITH FAT LAYER EXPOSED (HCC): Primary | ICD-10-CM

## 2019-08-01 DIAGNOSIS — L97.412 DIABETIC ULCER OF RIGHT MIDFOOT ASSOCIATED WITH TYPE 2 DIABETES MELLITUS, WITH FAT LAYER EXPOSED (HCC): Primary | ICD-10-CM

## 2019-08-01 PROCEDURE — 87071 CULTURE AEROBIC QUANT OTHER: CPT

## 2019-08-01 PROCEDURE — 11042 DBRDMT SUBQ TIS 1ST 20SQCM/<: CPT

## 2019-08-01 PROCEDURE — 87186 SC STD MICRODIL/AGAR DIL: CPT

## 2019-08-01 PROCEDURE — 87077 CULTURE AEROBIC IDENTIFY: CPT

## 2019-08-01 PROCEDURE — 87073 CULTURE BACTERIA ANAEROBIC: CPT

## 2019-08-01 RX ORDER — SULFAMETHOXAZOLE AND TRIMETHOPRIM 800; 160 MG/1; MG/1
1 TABLET ORAL 2 TIMES DAILY
Qty: 14 TABLET | Refills: 0 | Status: SHIPPED | OUTPATIENT
Start: 2019-08-01 | End: 2019-08-08

## 2019-08-01 NOTE — PROGRESS NOTES
Onset    Diabetes Mother     Other Father        SOCIAL HISTORY    Social History     Tobacco Use    Smoking status: Former Smoker    Smokeless tobacco: Never Used   Substance Use Topics    Alcohol use: Yes    Drug use: No       ALLERGIES    No Known Allergies    MEDICATIONS    Current Outpatient Medications on File Prior to Encounter   Medication Sig Dispense Refill    VASCEPA 1 g CAPS capsule       lisinopril (PRINIVIL;ZESTRIL) 10 MG tablet       metFORMIN (GLUCOPHAGE) 850 MG tablet Take 850 mg by mouth      simvastatin (ZOCOR) 40 MG tablet Take 40 mg by mouth      Multiple Vitamins-Minerals (THERAPEUTIC MULTIVITAMIN-MINERALS) tablet Take 1 tablet by mouth daily      gabapentin (NEURONTIN) 400 MG capsule Take 800 mg by mouth every 12 hours as needed. .       No current facility-administered medications on file prior to encounter. REVIEW OF SYSTEMS    Pertinent items are noted in HPI. Constitutional: Negative for systemic symptoms including fever, chills and malaise. Objective:      /79   Pulse 70   Temp 96.7 °F (35.9 °C) (Temporal)   Resp 16     PHYSICAL EXAM      General: The patient is in no acute distress. Mental status:  Patient is appropriate, is  oriented to place and plan of care. Dermatologic exam: Visual inspection of the periwound reveals the skin to be normal in turgor and texture  Wound exam: see wound description below in procedure note      Assessment:     Problem List Items Addressed This Visit     Diabetic ulcer of right midfoot associated with type 2 diabetes mellitus, with fat layer exposed ([address] - Primary    Other specified local infections of the skin and subcutaneous tissue        Procedure Note    Indications:  Based on my examination of this patient's wound(s) today, sharp excision into necrotic epidermis, dermis and subcutaneous tissue is required to promote healing and evaluate the extent of previous healing.     Performed by: Eugenia Sauceda provider:              HAWA with              SBQ loading:  Date 8/1/19 INSTRUCTED TO USE FRONT OFFLOAD SHOE RT FOOT               MWOGX Meds:              LIMND cleansing:                           CQ not scrub or use excessive force.                          Wash hands with soap and water before and after dressing changes.                         Prior to applying a clean dressing, cleanse wound with normal saline,                          wound cleanser, or mild soap and water.                           Ask your physician or nurse before getting the wound(s) wet in the shower.              Daily Wound management:                          Keep weight off wounds and reposition every 2 hours.                          Avoid standing for long periods of time.                                                Elevate legs to the level of the heart or above for 30 minutes 4-5 times a day and/or when sitting.                                               When taking antibiotics take entire prescription as ordered by MD do not stop taking until medicine is all gone.                                                                 Orders for this week: 8/1/19    CULTURE DONE RT 2ND TOE- 8/1/19    START BACTRIM DS ONE 2 X DAILY- RX GIVEN ON 8/1/19  RX FOR BACTROBAN GIVEN ON 8/1/19                     Right lateral planter- wash with soap and water daily. Pat dry. Apply medihoney 2x2  cover with mepilex border. Change daily       RIGHT 2ND TOE NAILBED - [address] WITH SOAP AND WATER APPLY BACTROBAN OINTMENT THIN LAYER  BANDAIDE CHANGE 2 X DAILY       Script for offloading shoes given 4/15/19- received - 8/1/19 INSTRUCTED TO WEAR FRONT OFFLOADING SHOE      Check feet daily        Follow up with  [facility] Monday 8/5/19 in the wound care center      Call [phone] for any questions or concerns.   Date__________   Time_________________                      Treatment Note      Written Patient Dismissal Instructions Given

## 2019-08-05 ENCOUNTER — HOSPITAL ENCOUNTER (OUTPATIENT)
Dept: WOUND CARE | Age: 59
Discharge: HOME | End: 2019-08-05
Payer: COMMERCIAL

## 2019-08-05 VITALS
SYSTOLIC BLOOD PRESSURE: 125 MMHG | HEART RATE: 78 BPM | RESPIRATION RATE: 18 BRPM | DIASTOLIC BLOOD PRESSURE: 75 MMHG | TEMPERATURE: 96.4 F

## 2019-08-05 DIAGNOSIS — E11.51 TYPE 2 DIABETES MELLITUS WITH DIABETIC PERIPHERAL ANGIOPATHY WITHOUT GANGRENE, WITHOUT LONG-TERM CURRENT USE OF INSULIN (HCC): ICD-10-CM

## 2019-08-05 DIAGNOSIS — E11.621 DIABETIC ULCER OF RIGHT MIDFOOT ASSOCIATED WITH TYPE 2 DIABETES MELLITUS, WITH FAT LAYER EXPOSED (HCC): ICD-10-CM

## 2019-08-05 DIAGNOSIS — L97.412 DIABETIC ULCER OF RIGHT MIDFOOT ASSOCIATED WITH TYPE 2 DIABETES MELLITUS, WITH FAT LAYER EXPOSED (HCC): ICD-10-CM

## 2019-08-05 DIAGNOSIS — L97.512 RIGHT FOOT ULCER, WITH FAT LAYER EXPOSED (HCC): Primary | ICD-10-CM

## 2019-08-05 LAB
CULTURE: ABNORMAL
Lab: ABNORMAL
SPECIMEN: ABNORMAL

## 2019-08-05 PROCEDURE — 11042 DBRDMT SUBQ TIS 1ST 20SQCM/<: CPT

## 2019-08-05 ASSESSMENT — PAIN SCALES - GENERAL: PAINLEVEL_OUTOF10: 0

## 2019-08-12 ENCOUNTER — HOSPITAL ENCOUNTER (OUTPATIENT)
Dept: WOUND CARE | Age: 59
Discharge: HOME | End: 2019-08-12
Payer: COMMERCIAL

## 2019-08-12 VITALS
RESPIRATION RATE: 18 BRPM | HEART RATE: 73 BPM | SYSTOLIC BLOOD PRESSURE: 123 MMHG | DIASTOLIC BLOOD PRESSURE: 76 MMHG | TEMPERATURE: 98.3 F

## 2019-08-12 DIAGNOSIS — E11.621 DIABETIC ULCER OF RIGHT MIDFOOT ASSOCIATED WITH TYPE 2 DIABETES MELLITUS, WITH FAT LAYER EXPOSED (HCC): Primary | ICD-10-CM

## 2019-08-12 DIAGNOSIS — L97.512 RIGHT FOOT ULCER, WITH FAT LAYER EXPOSED (HCC): ICD-10-CM

## 2019-08-12 DIAGNOSIS — L97.412 DIABETIC ULCER OF RIGHT MIDFOOT ASSOCIATED WITH TYPE 2 DIABETES MELLITUS, WITH FAT LAYER EXPOSED (HCC): Primary | ICD-10-CM

## 2019-08-12 DIAGNOSIS — E11.51 TYPE 2 DIABETES MELLITUS WITH DIABETIC PERIPHERAL ANGIOPATHY WITHOUT GANGRENE, WITHOUT LONG-TERM CURRENT USE OF INSULIN (HCC): ICD-10-CM

## 2019-08-12 PROCEDURE — 11042 DBRDMT SUBQ TIS 1ST 20SQCM/<: CPT

## 2019-08-12 RX ORDER — UREA 40 %
CREAM (GRAM) TOPICAL
Qty: 227 G | Refills: 2 | Status: SHIPPED | OUTPATIENT
Start: 2019-08-12

## 2019-08-12 ASSESSMENT — PAIN DESCRIPTION - PROGRESSION: CLINICAL_PROGRESSION: NOT CHANGED

## 2019-08-12 ASSESSMENT — PAIN SCALES - GENERAL: PAINLEVEL_OUTOF10: 5

## 2019-08-12 ASSESSMENT — PAIN DESCRIPTION - ONSET: ONSET: ON-GOING

## 2019-08-12 ASSESSMENT — PAIN DESCRIPTION - PAIN TYPE: TYPE: CHRONIC PAIN

## 2019-08-12 ASSESSMENT — PAIN DESCRIPTION - DESCRIPTORS: DESCRIPTORS: DISCOMFORT;ACHING;BURNING

## 2019-08-12 ASSESSMENT — PAIN - FUNCTIONAL ASSESSMENT: PAIN_FUNCTIONAL_ASSESSMENT: ACTIVITIES ARE NOT PREVENTED

## 2019-08-12 ASSESSMENT — PAIN DESCRIPTION - ORIENTATION: ORIENTATION: RIGHT

## 2019-08-12 ASSESSMENT — PAIN DESCRIPTION - FREQUENCY: FREQUENCY: CONTINUOUS

## 2019-08-12 ASSESSMENT — PAIN DESCRIPTION - LOCATION: LOCATION: FOOT

## 2019-08-12 NOTE — PROGRESS NOTES
level of the heart or above for 30 minutes 4-5 times a day and/or when sitting.                                               When taking antibiotics take entire prescription as ordered by MD do not stop taking until medicine is all gone.                                                                 Orders for this week: 8/12/19       Eurea cream script sent to [facility] in [address] [place] [address]- 8/1/19. Reviewed on 8/5 by Dr. Scotty Theodore, awaiting completed report.      START BACTRIM DS ONE 2 X DAILY- RX GIVEN ON 8/1/19  RX FOR BACTROBAN GIVEN ON 8/1/19                     Right lateral planter- wash with soap and water daily. Pat dry. ( in clinic today)  Apply damp enrrique to wound bed, cover with 2x2  cover with mepilex border. At home apply medihoney to wound bed, cover with fibracol, 2x2s and cover with mepilex border. Change daily         Script for offloading shoes given 4/15/19- received - 8/1/19 INSTRUCTED TO WEAR FRONT OFFLOADING SHOE      Check feet daily         Follow up with DR Emily Schumacher Monday in the wound care center      Call [phone] for any questions or concerns.   Date__________   Time_________________             Treatment Note      Written Patient Dismissal Instructions Given            Electronically signed by Jaci Walker DPM on 8/12/2019 at 8:50 AM

## 2019-08-19 ENCOUNTER — HOSPITAL ENCOUNTER (OUTPATIENT)
Dept: WOUND CARE | Age: 59
Discharge: HOME | End: 2019-08-19
Payer: COMMERCIAL

## 2019-08-19 VITALS
DIASTOLIC BLOOD PRESSURE: 76 MMHG | SYSTOLIC BLOOD PRESSURE: 115 MMHG | HEART RATE: 71 BPM | TEMPERATURE: 98 F | RESPIRATION RATE: 16 BRPM

## 2019-08-19 DIAGNOSIS — E11.621 DIABETIC ULCER OF RIGHT MIDFOOT ASSOCIATED WITH TYPE 2 DIABETES MELLITUS, WITH FAT LAYER EXPOSED (HCC): ICD-10-CM

## 2019-08-19 DIAGNOSIS — L97.512 RIGHT FOOT ULCER, WITH FAT LAYER EXPOSED (HCC): ICD-10-CM

## 2019-08-19 DIAGNOSIS — L97.412 DIABETIC ULCER OF RIGHT MIDFOOT ASSOCIATED WITH TYPE 2 DIABETES MELLITUS, WITH FAT LAYER EXPOSED (HCC): ICD-10-CM

## 2019-08-19 PROCEDURE — 11042 DBRDMT SUBQ TIS 1ST 20SQCM/<: CPT

## 2019-08-19 NOTE — PROGRESS NOTES
Wound Care Center Progress Note With Procedure    Ambrocio Last  AGE: 66 y.o. GENDER: male  : 1960  EPISODE DATE:  2019     Subjective:     Chief Complaint   Patient presents with    Wound Check     Right  plantar         HISTORY of PRESENT ILLNESS      Deneen Hernández is a 66 y.o. male who presents today for wound evaluation of Chronic diabetic ulcer(s) of the right plantar foot. The ulcer is of mild severity. The underlying cause of the wound is DM. Wound Pain Timing/Severity: none  Quality of pain: N/A  Severity of pain:  0 / 10   Modifying Factors: diabetes  Associated Signs/Symptoms: none        PAST MEDICAL HISTORY        Diagnosis Date    Diabetes mellitus ([address]     Diabetic ulcer of right midfoot associated with type 2 diabetes mellitus, with fat layer exposed ([address] 10/8/2018    Right foot ulcer, with fat layer exposed [facility] 10/8/2018       PAST SURGICAL HISTORY    Past Surgical History:   Procedure Laterality Date    FRACTURE SURGERY      left toe and right ankle and right knee       FAMILY HISTORY    Family History   Problem Relation Age of Onset    Diabetes Mother     Other Father        SOCIAL HISTORY    Social History     Tobacco Use    Smoking status: Former Smoker    Smokeless tobacco: Never Used   Substance Use Topics    Alcohol use: Yes    Drug use: No       ALLERGIES    No Known Allergies    MEDICATIONS    Current Outpatient Medications on File Prior to Encounter   Medication Sig Dispense Refill    VASCEPA 1 g CAPS capsule       lisinopril (PRINIVIL;ZESTRIL) 10 MG tablet       metFORMIN (GLUCOPHAGE) 850 MG tablet Take 850 mg by mouth      simvastatin (ZOCOR) 40 MG tablet Take 40 mg by mouth      Multiple Vitamins-Minerals (THERAPEUTIC MULTIVITAMIN-MINERALS) tablet Take 1 tablet by mouth daily      gabapentin (NEURONTIN) 400 MG capsule Take 800 mg by mouth every 12 hours as needed. Karen Spear Urea (CARMOL) 40 % cream Apply to affected area  g 2     No Length (cm) 0.7 cm 8/19/2019  8:17 AM   Wound Width (cm) 1.2 cm 8/19/2019  8:17 AM   Wound Depth (cm) 0.1 cm 8/19/2019  8:17 AM   Wound Surface Area (cm^2) 0.84 cm^2 8/19/2019  8:17 AM   Change in Wound Size % (l*w) -16.67 8/19/2019  8:17 AM   Wound Volume (cm^3) 0.08 cm^3 8/19/2019  8:17 AM   Wound Healing % 84 8/19/2019  8:17 AM   Post-Procedure Length (cm) 0.7 cm 8/19/2019  8:29 AM   Post-Procedure Width (cm) 1.2 cm 8/19/2019  8:29 AM   Post-Procedure Depth (cm) 0.1 cm 8/19/2019  8:29 AM   Post-Procedure Surface Area (cm^2) 0.84 cm^2 8/19/2019  8:29 AM   Post-Procedure Volume (cm^3) 0.08 cm^3 8/19/2019  8:29 AM   Distance Tunneling (cm) 0 cm 8/19/2019  8:17 AM   Tunneling Position ___ O'Clock 0 8/19/2019  8:17 AM   Undermining Starts ___ O'Clock 0 8/19/2019  8:17 AM   Undermining Ends___ O'Clock 0 8/19/2019  8:17 AM   Undermining Maxium Distance (cm) 0 8/19/2019  8:17 AM   Wound Assessment Pink 8/19/2019  8:17 AM   Drainage Amount Moderate 8/19/2019  8:17 AM   Drainage Description Serosanguinous 8/19/2019  8:17 AM   Odor None 8/19/2019  8:17 AM   Margins Defined edges; Unattached edges 8/19/2019  8:17 AM   Cathie-wound Assessment Calloused 8/19/2019  8:17 AM   Non-staged Wound Description Full thickness 8/19/2019  8:17 AM   [place]%Wound Bed 100 8/19/2019  8:17 AM   Red%Wound Bed 0 8/19/2019  8:17 AM   Yellow%Wound Bed 0 8/19/2019  8:17 AM   Black%Wound Bed 0 8/19/2019  8:17 AM   Purple%Wound Bed 0 8/19/2019  8:17 AM   Other%Wound Bed 0 8/19/2019  8:17 AM   Number of days: 153       Percent of Wound(s) Debrided: approximately 100%    Total  Area  Debrided:  0.84 sq cm     Bleeding:  Minimal    Hemostasis Achieved:  by pressure    Procedural Pain:  0  / 10     Post Procedural Pain:  0 / 10     Response to treatment:  Well tolerated by patient. Status of wound progress and description from last visit:   improved.       Plan:       Discharge Instructions         Plan:         Discharge Instructions           Discharge                                               [facility] taking antibiotics take entire prescription as ordered by MD do not stop taking until medicine is all gone.                                                                 Orders for this week: 8/19/19        Eurea cream script sent to [facility] in [facility]- 8/1/19. Reviewed on 8/5 by Dr. Saint Clair, awaiting completed report.      START BACTRIM DS ONE 2 X DAILY- RX GIVEN ON 8/1/19  RX FOR BACTROBAN GIVEN ON 8/1/19                     Right lateral planter- wash with soap and water daily. Pat dry. ( in clinic today)  Apply damp enrrique to wound bed, cover with 2x2  cover with mepilex border.  At home apply medihoney to wound bed, cover with fibracol, 2x2s and cover with mepilex border. Change daily         Script for offloading shoes given 4/15/19- received - 8/1/19 INSTRUCTED TO WEAR FRONT OFFLOADING SHOE      Check feet daily      Follow up with DR Giovanni Su Monday in the wound care center      Call [phone] for any questions or concerns.   Date__________   Time_________________             Treatment Note Wound 03/18/19 #4(onset 2 days) Right Lateral Foot ( tobar 2)-Dressing/Treatment: (silver nitrate used per Dr. Saint Clair )    Written Patient Dismissal Instructions Given            Electronically signed by Mian Harris DPM on 8/19/2019 at 8:39 AM

## 2019-08-26 ENCOUNTER — HOSPITAL ENCOUNTER (OUTPATIENT)
Dept: WOUND CARE | Age: 59
Discharge: HOME | End: 2019-08-26
Payer: COMMERCIAL

## 2019-11-04 ENCOUNTER — TELEPHONE (OUTPATIENT)
Dept: WOUND CARE | Age: 59
End: 2019-11-04

## 2020-07-12 ENCOUNTER — APPOINTMENT (OUTPATIENT)
Dept: GENERAL RADIOLOGY | Age: 60
End: 2020-07-12
Payer: COMMERCIAL

## 2020-07-12 ENCOUNTER — HOSPITAL ENCOUNTER (EMERGENCY)
Age: 60
Discharge: HOME | End: 2020-07-12
Attending: EMERGENCY MEDICINE
Payer: COMMERCIAL

## 2020-07-12 VITALS
DIASTOLIC BLOOD PRESSURE: 69 MMHG | OXYGEN SATURATION: 99 % | RESPIRATION RATE: 16 BRPM | BODY MASS INDEX: 37.22 KG/M2 | SYSTOLIC BLOOD PRESSURE: 102 MMHG | HEART RATE: 67 BPM | HEIGHT: 74 IN | WEIGHT: 290 LBS

## 2020-07-12 LAB
ALBUMIN SERPL-MCNC: 4.5 GM/DL (ref 3.4–5)
ALP BLD-CCNC: 56 IU/L (ref 40–129)
ALT SERPL-CCNC: 25 U/L (ref 10–40)
ANION GAP SERPL CALCULATED.3IONS-SCNC: 14 MMOL/L (ref 4–16)
AST SERPL-CCNC: 34 IU/L (ref 15–37)
BASOPHILS ABSOLUTE: 0 K/CU MM
BASOPHILS RELATIVE PERCENT: 0.3 % (ref 0–1)
BILIRUB SERPL-MCNC: 0.4 MG/DL (ref 0–1)
BUN BLDV-MCNC: 20 MG/DL (ref 6–23)
CALCIUM SERPL-MCNC: 9.9 MG/DL (ref 8.3–10.6)
CHLORIDE BLD-SCNC: 99 MMOL/L (ref 99–110)
CO2: 21 MMOL/L (ref 21–32)
CREAT SERPL-MCNC: 0.8 MG/DL (ref 0.9–1.3)
DIFFERENTIAL TYPE: ABNORMAL
EOSINOPHILS ABSOLUTE: 0.1 K/CU MM
EOSINOPHILS RELATIVE PERCENT: 2 % (ref 0–3)
GFR AFRICAN AMERICAN: >60 ML/MIN/1.73M2
GFR NON-AFRICAN AMERICAN: >60 ML/MIN/1.73M2
GLUCOSE BLD-MCNC: 126 MG/DL (ref 70–99)
HCT VFR BLD CALC: 42.6 % (ref 42–52)
HEMOGLOBIN: 13.7 GM/DL (ref 13.5–18)
IMMATURE NEUTROPHIL %: 0.3 % (ref 0–0.43)
LYMPHOCYTES ABSOLUTE: 2 K/CU MM
LYMPHOCYTES RELATIVE PERCENT: 33.1 % (ref 24–44)
MCH RBC QN AUTO: 28.8 PG (ref 27–31)
MCHC RBC AUTO-ENTMCNC: 32.2 % (ref 32–36)
MCV RBC AUTO: 89.7 FL (ref 78–100)
MONOCYTES ABSOLUTE: 0.5 K/CU MM
MONOCYTES RELATIVE PERCENT: 8 % (ref 0–4)
PDW BLD-RTO: 12.4 % (ref 11.7–14.9)
PLATELET # BLD: 217 K/CU MM (ref 140–440)
PMV BLD AUTO: 10 FL (ref 7.5–11.1)
POTASSIUM SERPL-SCNC: 4.2 MMOL/L (ref 3.5–5.1)
RBC # BLD: 4.75 M/CU MM (ref 4.6–6.2)
SEGMENTED NEUTROPHILS ABSOLUTE COUNT: 3.5 K/CU MM
SEGMENTED NEUTROPHILS RELATIVE PERCENT: 56.3 % (ref 36–66)
SODIUM BLD-SCNC: 134 MMOL/L (ref 135–145)
TOTAL IMMATURE NEUTOROPHIL: 0.02 K/CU MM
TOTAL PROTEIN: 8.2 GM/DL (ref 6.4–8.2)
WBC # BLD: 6.1 K/CU MM (ref 4–10.5)

## 2020-07-12 PROCEDURE — 96365 THER/PROPH/DIAG IV INF INIT: CPT

## 2020-07-12 PROCEDURE — 2580000003 HC RX 258: Performed by: EMERGENCY MEDICINE

## 2020-07-12 PROCEDURE — 96375 TX/PRO/DX INJ NEW DRUG ADDON: CPT

## 2020-07-12 PROCEDURE — 73630 X-RAY EXAM OF FOOT: CPT

## 2020-07-12 PROCEDURE — 85025 COMPLETE CBC W/AUTO DIFF WBC: CPT

## 2020-07-12 PROCEDURE — 6360000002 HC RX W HCPCS: Performed by: EMERGENCY MEDICINE

## 2020-07-12 PROCEDURE — 80053 COMPREHEN METABOLIC PANEL: CPT

## 2020-07-12 PROCEDURE — 99284 EMERGENCY DEPT VISIT MOD MDM: CPT

## 2020-07-12 RX ORDER — KETOROLAC TROMETHAMINE 30 MG/ML
30 INJECTION, SOLUTION INTRAMUSCULAR; INTRAVENOUS ONCE
Status: COMPLETED | OUTPATIENT
Start: 2020-07-12 | End: 2020-07-12

## 2020-07-12 RX ORDER — HYDROCODONE BITARTRATE AND ACETAMINOPHEN 5; 325 MG/1; MG/1
1 TABLET ORAL EVERY 6 HOURS PRN
Qty: 10 TABLET | Refills: 0 | Status: SHIPPED | OUTPATIENT
Start: 2020-07-12 | End: 2020-07-15

## 2020-07-12 RX ORDER — SULFAMETHOXAZOLE AND TRIMETHOPRIM 800; 160 MG/1; MG/1
1 TABLET ORAL 2 TIMES DAILY
Qty: 20 TABLET | Refills: 0 | Status: SHIPPED | OUTPATIENT
Start: 2020-07-12 | End: 2020-07-22

## 2020-07-12 RX ADMIN — KETOROLAC TROMETHAMINE 30 MG: 30 INJECTION, SOLUTION INTRAMUSCULAR; INTRAVENOUS at 13:45

## 2020-07-12 RX ADMIN — PIPERACILLIN SODIUM AND TAZOBACTAM SODIUM 3.38 G: 3; .375 INJECTION, POWDER, LYOPHILIZED, FOR SOLUTION INTRAVENOUS at 12:28

## 2020-07-12 ASSESSMENT — PAIN DESCRIPTION - ORIENTATION: ORIENTATION: LEFT

## 2020-07-12 ASSESSMENT — PAIN SCALES - GENERAL
PAINLEVEL_OUTOF10: 6
PAINLEVEL_OUTOF10: 6

## 2020-07-12 ASSESSMENT — PAIN DESCRIPTION - LOCATION: LOCATION: FOOT

## 2020-07-12 NOTE — ED NOTES
Discharge instructions and Rx given. Voices understanding. Ambulates without difficulty to private vehicle.       Stacia Miller RN  07/12/20 [id number]

## 2020-07-12 NOTE — ED NOTES
[facility] called back speaking with Dr. Sabine Salas, RN  07/12/20 [id number]

## 2020-07-12 NOTE — ED PROVIDER NOTES
Emergency Department Encounter  Location: [address]    Patient: Jerry Torres  MRN: [id number]  : 1960  Date of evaluation: 2020  ED Provider: Arsalan Saeed DO, FACEP    Chief Complaint:    Wound Check (pt has a diabetic foot  ulcer on left foot x 1 week)    Cachil DeHe:  Jerry Torres is a 64 y.o. male that presents to the emergency department with complaints of a diabetic foot ulcer on his left foot. The patient is complaining of an area of ulceration on the ball of his left foot. He states his blood sugar has been in the 1 50-1 80 range lately but he has not taken it today. The patient's had history of a diabetic foot ulcer on his right foot that required debridement by the podiatrist.  He states he has pain associated with this area. He states it started out about 1/2 inch in diameter and he pulled a hunk of dead skin off of it on Friday and it seems to be much worse and much deeper at this time. He denies fever or chills. He denies nausea or vomiting. He states he got his care through the wound clinic. ROS:  At least 4 systems reviewed and otherwise acutely negative except as in the [address]   Negative for fever or chills  Negative for chest pain  Negative for shortness of breath  Negative for nausea vomiting diarrhea or constipation    Past Medical History:   Diagnosis Date    Diabetes mellitus ([address]     Diabetic ulcer of right midfoot associated with type 2 diabetes mellitus, with fat layer exposed ([address] 10/8/2018    Right foot ulcer, with fat layer exposed ([address] 10/8/2018     Past Surgical History:   Procedure Laterality Date    FRACTURE SURGERY      left toe and right ankle and right knee     Family History   Problem Relation Age of Onset    Diabetes Mother     Other Father      Social History     Socioeconomic History    Marital status:      Spouse name: Not on file    Number of children: Not on file    Years of education: Not on file    Highest education level: Not on file   Occupational History    Not on file   Social Needs    Financial resource strain: Not on file    Food insecurity     Worry: Not on file     Inability: Not on file    Transportation needs     Medical: Not on file     Non-medical: Not on file   Tobacco Use    Smoking status: Former Smoker    Smokeless tobacco: Never Used   Substance and Sexual Activity    Alcohol use: Yes    Drug use: No    Sexual activity: Yes     Partners: Female   Lifestyle    Physical activity     Days per week: Not on file     Minutes per session: Not on file    Stress: Not on file   Relationships    Social connections     Talks on phone: Not on file     Gets together: Not on file     Attends Rastafarian service: Not on file     Active member of club or organization: Not on file     Attends meetings of clubs or organizations: Not on file     Relationship status: Not on file    Intimate partner violence     Fear of current or ex partner: Not on file     Emotionally abused: Not on file     Physically abused: Not on file     Forced sexual activity: Not on file   Other Topics Concern    Not on file   Social History Narrative    Not on file     No current facility-administered medications for this encounter. Current Outpatient Medications   Medication Sig Dispense Refill    HYDROcodone-acetaminophen (NORCO) 5-325 MG per tablet Take 1 tablet by mouth every 6 hours as needed for Pain for up to 3 days.  10 tablet 0    sulfamethoxazole-trimethoprim (BACTRIM DS) 800-160 MG per tablet Take 1 tablet by mouth 2 times daily for 10 days 20 tablet 0    Urea (CARMOL) 40 % cream Apply to affected area  g 2    VASCEPA 1 g CAPS capsule       lisinopril (PRINIVIL;ZESTRIL) 10 MG tablet       metFORMIN (GLUCOPHAGE) 850 MG tablet Take 850 mg by mouth      simvastatin (ZOCOR) 40 MG tablet Take 40 mg by mouth      Multiple Vitamins-Minerals (THERAPEUTIC MULTIVITAMIN-MINERALS) tablet Take 1 tablet by mouth daily  gabapentin (NEURONTIN) 400 MG capsule Take 800 mg by mouth every 12 hours as needed. .       No Known Allergies  Nursing Notes Reviewed    Physical Exam:  ED Triage Vitals [07/12/20 1156]   Enc Vitals Group      /69      Pulse 67      Resp 16      Temp       Temp src       SpO2 99 %      Weight 290 lb (131.5 kg)      Height 6' 2\" (1.88 m)      Head Circumference       Peak Flow       Pain Score       Pain Loc       Pain Edu? Excl. in [address]? GENERAL APPEARANCE: Awake and alert. Cooperative. No acute distress. Nontoxic in appearance  HEAD: Normocephalic. Atraumatic. EYES: Sclera anicteric. ENT: Tolerates saliva. NECK: Supple. Trachea midline. LUNGS: Respirations unlabored. EXTREMITIES: No acute deformities. SKIN: Warm and dry. Patient has a diabetic foot ulcer that is approximately 2 inches in diameter. It is necrotic in the center. There is minimal purulent drainage around the outside. It is tender to the touch. The borders are minimally reddened. NEUROLOGICAL: No gross facial drooping. PSYCHIATRIC: Normal mood.     Labs:  Results for orders placed or performed during the hospital encounter of 07/12/20   CBC Auto Differential   Result Value Ref Range    WBC 6.1 4.0 - 10.5 K/CU MM    RBC 4.75 4.6 - 6.2 M/CU MM    Hemoglobin 13.7 13.5 - 18.0 GM/DL    Hematocrit 42.6 42 - 52 %    MCV 89.7 78 - 100 FL    MCH 28.8 27 - 31 PG    MCHC 32.2 32.0 - 36.0 %    RDW 12.4 11.7 - 14.9 %    Platelets 101 152 - 155 K/CU MM    MPV 10.0 7.5 - 11.1 FL    Differential Type AUTOMATED DIFFERENTIAL     Segs Relative 56.3 36 - 66 %    Lymphocytes % 33.1 24 - 44 %    Monocytes % 8.0 (H) 0 - 4 %    Eosinophils % 2.0 0 - 3 %    Basophils % 0.3 0 - 1 %    Segs Absolute 3.5 K/CU MM    Lymphocytes Absolute 2.0 K/CU MM    Monocytes Absolute 0.5 K/CU MM    Eosinophils Absolute 0.1 K/CU MM    Basophils Absolute 0.0 K/CU MM    Immature Neutrophil % 0.3 0 - 0.43 %    Total Immature Neutrophil 0.02 K/CU MM   Comprehensive Metabolic Panel   Result Value Ref Range    Sodium 134 (L) 135 - 145 MMOL/L    Potassium 4.2 3.5 - 5.1 MMOL/L    Chloride 99 99 - 110 mMol/L    CO2 21 21 - 32 MMOL/L    BUN 20 6 - 23 MG/DL    CREATININE 0.8 (L) 0.9 - 1.3 MG/DL    Glucose 126 (H) 70 - 99 MG/DL    Calcium 9.9 8.3 - 10.6 MG/DL    Alb 4.5 3.4 - 5.0 GM/DL    Total Protein 8.2 6.4 - 8.2 GM/DL    Total Bilirubin 0.4 0.0 - 1.0 MG/DL    ALT 25 10 - 40 U/L    AST 34 15 - 37 IU/L    Alkaline Phosphatase 56 40 - 129 IU/L    GFR Non-African American >60 >60 mL/min/1.73m2    GFR African American >60 >60 mL/min/1.73m2    Anion Gap 14 4 - 16       Radiographs (if obtained):  [] The following radiograph was interpreted by myself in the absence of a radiologist:  [x] Radiologist's Report reviewed at time of ED visit:  XR FOOT LEFT (MIN 3 VIEWS)   Final Result   1. Ulcer at the plantar forefoot. No aggressive osseous destruction. If   there is clinical concern regarding osteomyelitis, MRI without and with   contrast should be performed. 2. Remote healed fracture deformity of the distal phalanx of the 1st digit. Degenerative changes as above. 3. No acute fracture or dislocation. ED Course and MDM:  There is no evidence of osteomyelitis on the patient's foot x-ray. His white blood cell count is normal.  I discussed his care with Dr. Saint Clair who advised me to have him call the office To set up an appointment to be seen on Tuesday. He will be started on Bactrim and is given Norco for pain. He is instructed to stay off of his foot. He does have a boot that he is modified for his left foot. He is instructed to return if his condition worsens and is discharged in stable condition to follow-up with Dr. Saint Clair and is instructed to return for any problems or concerns  Final Impression:  1.  Diabetic ulcer of left midfoot associated with type 2 diabetes mellitus, limited to breakdown of skin [facility])      DISPOSITION Discharge - Pending Orders Complete    Patient referred to:  Quiana Pal DPM  [address] [facility] [id number]    Call in 1 day  To be seen on Tuesday, For follow up    Discharge medications:  New Prescriptions    HYDROCODONE-ACETAMINOPHEN (NORCO) 5-325 MG PER TABLET    Take 1 tablet by mouth every 6 hours as needed for Pain for up to 3 days.     SULFAMETHOXAZOLE-TRIMETHOPRIM (BACTRIM DS) 800-160 MG PER TABLET    Take 1 tablet by mouth 2 times daily for 10 days     (Please note that portions of this note may have been completed with a voice recognition program. Efforts were made to edit the dictations but occasionally words are mis-transcribed.)    Pablo Zaldivar DO, [facility]  Board certified in [address] [place] [address]  07/12/20 [id number]

## 2020-11-07 ENCOUNTER — HOSPITAL ENCOUNTER (INPATIENT)
Age: 60
LOS: 2 days | Discharge: HOME | DRG: 638 | End: 2020-11-09
Attending: EMERGENCY MEDICINE | Admitting: INTERNAL MEDICINE
Payer: COMMERCIAL

## 2020-11-07 ENCOUNTER — APPOINTMENT (OUTPATIENT)
Dept: GENERAL RADIOLOGY | Age: 60
DRG: 638 | End: 2020-11-07
Payer: COMMERCIAL

## 2020-11-07 PROBLEM — L03.115 CELLULITIS OF RIGHT LOWER LIMB: Status: ACTIVE | Noted: 2020-11-07

## 2020-11-07 LAB
ALBUMIN SERPL-MCNC: 3.7 GM/DL (ref 3.4–5)
ALP BLD-CCNC: 52 IU/L (ref 40–129)
ALT SERPL-CCNC: 15 U/L (ref 10–40)
ANION GAP SERPL CALCULATED.3IONS-SCNC: 10 MMOL/L (ref 4–16)
AST SERPL-CCNC: 18 IU/L (ref 15–37)
BASOPHILS ABSOLUTE: 0 K/CU MM
BASOPHILS RELATIVE PERCENT: 0.1 % (ref 0–1)
BILIRUB SERPL-MCNC: 0.8 MG/DL (ref 0–1)
BUN BLDV-MCNC: 15 MG/DL (ref 6–23)
CALCIUM SERPL-MCNC: 8.5 MG/DL (ref 8.3–10.6)
CHLORIDE BLD-SCNC: 96 MMOL/L (ref 99–110)
CO2: 26 MMOL/L (ref 21–32)
CREAT SERPL-MCNC: 1 MG/DL (ref 0.9–1.3)
DIFFERENTIAL TYPE: ABNORMAL
EOSINOPHILS ABSOLUTE: 0 K/CU MM
EOSINOPHILS RELATIVE PERCENT: 0 % (ref 0–3)
GFR AFRICAN AMERICAN: >60 ML/MIN/1.73M2
GFR NON-AFRICAN AMERICAN: >60 ML/MIN/1.73M2
GLUCOSE BLD-MCNC: 125 MG/DL (ref 70–99)
GLUCOSE BLD-MCNC: 158 MG/DL (ref 70–99)
GLUCOSE BLD-MCNC: 192 MG/DL (ref 70–99)
HCT VFR BLD CALC: 36.2 % (ref 42–52)
HEMOGLOBIN: 12.2 GM/DL (ref 13.5–18)
IMMATURE NEUTROPHIL %: 0.5 % (ref 0–0.43)
LYMPHOCYTES ABSOLUTE: 1.1 K/CU MM
LYMPHOCYTES RELATIVE PERCENT: 10.9 % (ref 24–44)
MCH RBC QN AUTO: 29.6 PG (ref 27–31)
MCHC RBC AUTO-ENTMCNC: 33.7 % (ref 32–36)
MCV RBC AUTO: 87.9 FL (ref 78–100)
MONOCYTES ABSOLUTE: 1.5 K/CU MM
MONOCYTES RELATIVE PERCENT: 14.6 % (ref 0–4)
PDW BLD-RTO: 12.7 % (ref 11.7–14.9)
PLATELET # BLD: 137 K/CU MM (ref 140–440)
PMV BLD AUTO: 10.3 FL (ref 7.5–11.1)
POTASSIUM SERPL-SCNC: 3.9 MMOL/L (ref 3.5–5.1)
RBC # BLD: 4.12 M/CU MM (ref 4.6–6.2)
SEGMENTED NEUTROPHILS ABSOLUTE COUNT: 7.5 K/CU MM
SEGMENTED NEUTROPHILS RELATIVE PERCENT: 73.9 % (ref 36–66)
SODIUM BLD-SCNC: 132 MMOL/L (ref 135–145)
TOTAL IMMATURE NEUTOROPHIL: 0.05 K/CU MM
TOTAL PROTEIN: 6.8 GM/DL (ref 6.4–8.2)
WBC # BLD: 10.2 K/CU MM (ref 4–10.5)

## 2020-11-07 PROCEDURE — 80053 COMPREHEN METABOLIC PANEL: CPT

## 2020-11-07 PROCEDURE — G0378 HOSPITAL OBSERVATION PER HR: HCPCS

## 2020-11-07 PROCEDURE — 84550 ASSAY OF BLOOD/URIC ACID: CPT

## 2020-11-07 PROCEDURE — 36415 COLL VENOUS BLD VENIPUNCTURE: CPT

## 2020-11-07 PROCEDURE — 96372 THER/PROPH/DIAG INJ SC/IM: CPT

## 2020-11-07 PROCEDURE — 6360000002 HC RX W HCPCS: Performed by: INTERNAL MEDICINE

## 2020-11-07 PROCEDURE — 2580000003 HC RX 258: Performed by: INTERNAL MEDICINE

## 2020-11-07 PROCEDURE — 6360000002 HC RX W HCPCS: Performed by: EMERGENCY MEDICINE

## 2020-11-07 PROCEDURE — 6370000000 HC RX 637 (ALT 250 FOR IP): Performed by: EMERGENCY MEDICINE

## 2020-11-07 PROCEDURE — 2580000003 HC RX 258: Performed by: EMERGENCY MEDICINE

## 2020-11-07 PROCEDURE — 73630 X-RAY EXAM OF FOOT: CPT

## 2020-11-07 PROCEDURE — 96366 THER/PROPH/DIAG IV INF ADDON: CPT

## 2020-11-07 PROCEDURE — 87040 BLOOD CULTURE FOR BACTERIA: CPT

## 2020-11-07 PROCEDURE — 99285 EMERGENCY DEPT VISIT HI MDM: CPT

## 2020-11-07 PROCEDURE — 96367 TX/PROPH/DG ADDL SEQ IV INF: CPT

## 2020-11-07 PROCEDURE — 96376 TX/PRO/DX INJ SAME DRUG ADON: CPT

## 2020-11-07 PROCEDURE — 87150 DNA/RNA AMPLIFIED PROBE: CPT

## 2020-11-07 PROCEDURE — 96365 THER/PROPH/DIAG IV INF INIT: CPT

## 2020-11-07 PROCEDURE — 1200000000 HC SEMI PRIVATE

## 2020-11-07 PROCEDURE — 85025 COMPLETE CBC W/AUTO DIFF WBC: CPT

## 2020-11-07 PROCEDURE — 96375 TX/PRO/DX INJ NEW DRUG ADDON: CPT

## 2020-11-07 PROCEDURE — 82962 GLUCOSE BLOOD TEST: CPT

## 2020-11-07 PROCEDURE — 6370000000 HC RX 637 (ALT 250 FOR IP): Performed by: INTERNAL MEDICINE

## 2020-11-07 RX ORDER — COLCHICINE 0.6 MG/1
0.6 TABLET ORAL DAILY
Status: DISCONTINUED | OUTPATIENT
Start: 2020-11-07 | End: 2020-11-09 | Stop reason: HOSPADM

## 2020-11-07 RX ORDER — MORPHINE SULFATE 2 MG/ML
1 INJECTION, SOLUTION INTRAMUSCULAR; INTRAVENOUS EVERY 4 HOURS PRN
Status: DISCONTINUED | OUTPATIENT
Start: 2020-11-07 | End: 2020-11-09 | Stop reason: HOSPADM

## 2020-11-07 RX ORDER — ATORVASTATIN CALCIUM 10 MG/1
10 TABLET, FILM COATED ORAL DAILY
Status: DISCONTINUED | OUTPATIENT
Start: 2020-11-07 | End: 2020-11-09 | Stop reason: HOSPADM

## 2020-11-07 RX ORDER — DEXTROSE MONOHYDRATE 50 MG/ML
100 INJECTION, SOLUTION INTRAVENOUS PRN
Status: DISCONTINUED | OUTPATIENT
Start: 2020-11-07 | End: 2020-11-09 | Stop reason: HOSPADM

## 2020-11-07 RX ORDER — ONDANSETRON 2 MG/ML
4 INJECTION INTRAMUSCULAR; INTRAVENOUS EVERY 6 HOURS PRN
Status: DISCONTINUED | OUTPATIENT
Start: 2020-11-07 | End: 2020-11-09 | Stop reason: HOSPADM

## 2020-11-07 RX ORDER — ACETAMINOPHEN 325 MG/1
650 TABLET ORAL EVERY 6 HOURS PRN
Status: DISCONTINUED | OUTPATIENT
Start: 2020-11-07 | End: 2020-11-09 | Stop reason: HOSPADM

## 2020-11-07 RX ORDER — TRAMADOL HYDROCHLORIDE 50 MG/1
100 TABLET ORAL EVERY 6 HOURS PRN
Status: DISCONTINUED | OUTPATIENT
Start: 2020-11-07 | End: 2020-11-09 | Stop reason: HOSPADM

## 2020-11-07 RX ORDER — IBUPROFEN 800 MG/1
800 TABLET ORAL ONCE
Status: COMPLETED | OUTPATIENT
Start: 2020-11-07 | End: 2020-11-07

## 2020-11-07 RX ORDER — ACETAMINOPHEN 500 MG
1000 TABLET ORAL
Status: COMPLETED | OUTPATIENT
Start: 2020-11-07 | End: 2020-11-07

## 2020-11-07 RX ORDER — FENTANYL CITRATE 50 UG/ML
50 INJECTION, SOLUTION INTRAMUSCULAR; INTRAVENOUS ONCE
Status: COMPLETED | OUTPATIENT
Start: 2020-11-07 | End: 2020-11-07

## 2020-11-07 RX ORDER — NICOTINE POLACRILEX 4 MG
15 LOZENGE BUCCAL PRN
Status: DISCONTINUED | OUTPATIENT
Start: 2020-11-07 | End: 2020-11-09 | Stop reason: HOSPADM

## 2020-11-07 RX ORDER — TRAMADOL HYDROCHLORIDE 50 MG/1
50 TABLET ORAL EVERY 6 HOURS PRN
Status: DISCONTINUED | OUTPATIENT
Start: 2020-11-07 | End: 2020-11-09 | Stop reason: HOSPADM

## 2020-11-07 RX ORDER — LISINOPRIL 5 MG/1
5 TABLET ORAL DAILY
Status: DISCONTINUED | OUTPATIENT
Start: 2020-11-07 | End: 2020-11-09 | Stop reason: HOSPADM

## 2020-11-07 RX ORDER — PROMETHAZINE HYDROCHLORIDE 12.5 MG/1
12.5 TABLET ORAL EVERY 6 HOURS PRN
Status: DISCONTINUED | OUTPATIENT
Start: 2020-11-07 | End: 2020-11-09 | Stop reason: HOSPADM

## 2020-11-07 RX ORDER — OMEGA-3-ACID ETHYL ESTERS 1 G/1
1 CAPSULE, LIQUID FILLED ORAL DAILY
Status: DISCONTINUED | OUTPATIENT
Start: 2020-11-07 | End: 2020-11-09 | Stop reason: HOSPADM

## 2020-11-07 RX ORDER — GABAPENTIN 400 MG/1
800 CAPSULE ORAL DAILY
Status: DISCONTINUED | OUTPATIENT
Start: 2020-11-07 | End: 2020-11-09 | Stop reason: HOSPADM

## 2020-11-07 RX ORDER — ICOSAPENT ETHYL 1000 MG/1
1 CAPSULE ORAL DAILY
Status: DISCONTINUED | OUTPATIENT
Start: 2020-11-07 | End: 2020-11-07 | Stop reason: CLARIF

## 2020-11-07 RX ORDER — POLYETHYLENE GLYCOL 3350 17 G/17G
17 POWDER, FOR SOLUTION ORAL DAILY PRN
Status: DISCONTINUED | OUTPATIENT
Start: 2020-11-07 | End: 2020-11-09 | Stop reason: HOSPADM

## 2020-11-07 RX ORDER — ACETAMINOPHEN 650 MG/1
650 SUPPOSITORY RECTAL EVERY 6 HOURS PRN
Status: DISCONTINUED | OUTPATIENT
Start: 2020-11-07 | End: 2020-11-09 | Stop reason: HOSPADM

## 2020-11-07 RX ORDER — SODIUM CHLORIDE 0.9 % (FLUSH) 0.9 %
10 SYRINGE (ML) INJECTION PRN
Status: DISCONTINUED | OUTPATIENT
Start: 2020-11-07 | End: 2020-11-09 | Stop reason: HOSPADM

## 2020-11-07 RX ORDER — DEXTROSE MONOHYDRATE 25 G/50ML
12.5 INJECTION, SOLUTION INTRAVENOUS PRN
Status: DISCONTINUED | OUTPATIENT
Start: 2020-11-07 | End: 2020-11-09 | Stop reason: HOSPADM

## 2020-11-07 RX ORDER — DOXYCYCLINE HYCLATE 100 MG
100 TABLET ORAL 2 TIMES DAILY
Status: ON HOLD | COMMUNITY
End: 2020-11-09 | Stop reason: HOSPADM

## 2020-11-07 RX ORDER — SODIUM CHLORIDE 0.9 % (FLUSH) 0.9 %
10 SYRINGE (ML) INJECTION EVERY 12 HOURS SCHEDULED
Status: DISCONTINUED | OUTPATIENT
Start: 2020-11-07 | End: 2020-11-09 | Stop reason: HOSPADM

## 2020-11-07 RX ADMIN — PIPERACILLIN AND TAZOBACTAM 3.38 G: 3; .375 INJECTION, POWDER, LYOPHILIZED, FOR SOLUTION INTRAVENOUS at 10:03

## 2020-11-07 RX ADMIN — TRAMADOL HYDROCHLORIDE 100 MG: 50 TABLET, FILM COATED ORAL at 19:40

## 2020-11-07 RX ADMIN — OMEGA-3-ACID ETHYL ESTERS 1 G: 1 CAPSULE, LIQUID FILLED ORAL at 15:48

## 2020-11-07 RX ADMIN — ACETAMINOPHEN 650 MG: 325 TABLET ORAL at 17:05

## 2020-11-07 RX ADMIN — ENOXAPARIN SODIUM 40 MG: 40 INJECTION, SOLUTION INTRAVENOUS; SUBCUTANEOUS at 15:46

## 2020-11-07 RX ADMIN — MORPHINE SULFATE 1 MG: 2 INJECTION, SOLUTION INTRAMUSCULAR; INTRAVENOUS at 15:48

## 2020-11-07 RX ADMIN — MORPHINE SULFATE 1 MG: 2 INJECTION, SOLUTION INTRAMUSCULAR; INTRAVENOUS at 21:25

## 2020-11-07 RX ADMIN — VANCOMYCIN HYDROCHLORIDE 2000 MG: 1 INJECTION, POWDER, LYOPHILIZED, FOR SOLUTION INTRAVENOUS at 10:44

## 2020-11-07 RX ADMIN — PIPERACILLIN AND TAZOBACTAM 3.38 G: 3; .375 INJECTION, POWDER, LYOPHILIZED, FOR SOLUTION INTRAVENOUS at 18:01

## 2020-11-07 RX ADMIN — ACETAMINOPHEN 1000 MG: 500 TABLET ORAL at 10:08

## 2020-11-07 RX ADMIN — FENTANYL CITRATE 50 MCG: 50 INJECTION INTRAMUSCULAR; INTRAVENOUS at 09:58

## 2020-11-07 RX ADMIN — COLCHICINE 0.6 MG: 0.6 TABLET, FILM COATED ORAL at 15:48

## 2020-11-07 RX ADMIN — IBUPROFEN 800 MG: 800 TABLET ORAL at 19:32

## 2020-11-07 RX ADMIN — VANCOMYCIN HYDROCHLORIDE 1500 MG: 1 INJECTION, POWDER, LYOPHILIZED, FOR SOLUTION INTRAVENOUS at 23:13

## 2020-11-07 RX ADMIN — TRAMADOL HYDROCHLORIDE 100 MG: 50 TABLET, FILM COATED ORAL at 13:17

## 2020-11-07 ASSESSMENT — ENCOUNTER SYMPTOMS
COUGH: 0
NAUSEA: 0
EYE DISCHARGE: 0
RHINORRHEA: 0
COLOR CHANGE: 1
EYE PAIN: 0
ABDOMINAL PAIN: 0
VOMITING: 0
SORE THROAT: 0
BACK PAIN: 0
SHORTNESS OF BREATH: 0

## 2020-11-07 ASSESSMENT — PAIN DESCRIPTION - ORIENTATION
ORIENTATION: RIGHT
ORIENTATION: LEFT
ORIENTATION: LEFT

## 2020-11-07 ASSESSMENT — PAIN DESCRIPTION - FREQUENCY
FREQUENCY: CONTINUOUS
FREQUENCY: CONTINUOUS

## 2020-11-07 ASSESSMENT — PAIN DESCRIPTION - LOCATION
LOCATION: FOOT

## 2020-11-07 ASSESSMENT — PAIN SCALES - GENERAL
PAINLEVEL_OUTOF10: 4
PAINLEVEL_OUTOF10: 7
PAINLEVEL_OUTOF10: 7
PAINLEVEL_OUTOF10: 8
PAINLEVEL_OUTOF10: 10
PAINLEVEL_OUTOF10: 6
PAINLEVEL_OUTOF10: 7
PAINLEVEL_OUTOF10: 8

## 2020-11-07 ASSESSMENT — PAIN DESCRIPTION - PAIN TYPE
TYPE: ACUTE PAIN

## 2020-11-07 ASSESSMENT — PAIN DESCRIPTION - DESCRIPTORS
DESCRIPTORS: SHARP;TINGLING
DESCRIPTORS: ACHING;BURNING

## 2020-11-07 NOTE — PROGRESS NOTES
PHARMACY TO DOSE VANCOMYCIN PER DR Joey Holt    INFECTION BEING TREATED = Cellulitis  CULTURES = None recent  OTHER ABT'S = Zosyn    AGE = 64 y.o.     SEX = male  HEIGHT = 6' 2\" (1.88 m)  Wt Readings from Last 3 Encounters:   11/07/20 289 lb 8 oz (131.3 kg)   07/12/20 290 lb (131.5 kg)   06/10/19 277 lb (125.6 kg)     Estimated Creatinine Clearance: 116 mL/min (based on SCr of 1 mg/dL). Recent Labs     11/07/20  1115   WBC 10.2   BUN 15   CREATININE 1.0     DOSING PLAN COMMENTS:  2 GM loading dose given in ER @~1045  Plan to start patient on 1500 mg Q12h for 2300 tonight 11/7/20  Goal trough range 12-18 for cellulitis, Expected AUC24/VIVI~566    VANCO TROUGH SCHEDULED FOR 11/8/20 @2230    Pharmacy to follow and adjust accordingly. Thank you for allowing pharmacy to assist in patient care.      [facility] [facility]  11/7/2020   1:46 PM  _______________________________________

## 2020-11-07 NOTE — H&P
History and Physical  Nitza Nichols MD    11/7/2020  Salomon Bah  1960    PCP: Jimy Dela Cruz MD    ASSESSMENT AND PLAN:      1. Cellulitis failed outpatient abx; with DM2 ulcer  - iv vanc+zosyn  - blood cultures are ordered  - wound care consulted  - prn tramadol    2. ?goutright hallux  - will try colchicine     3. DM2 NIDDM  - is on metformin  - poct prn/daily    4. htn  - pain may cause bp to rise  - continue home lisinopril    Expected LOS 24-48 hrs      Cc: fever    HPI: Salomon Bah is a 64 y.o. Male who presented to ED with fever 101 and pain in both feet. He follows with Dr. Saint Clair at wound care clinic. He has a blister on the left foot which burst open, one under his left foot great toe and the right hallux is tender and swollen and red. Denied nausea or chills. Otherwise feels well. Was prescribed doxycycline which he started taking on Thursday and has not had any improvement. PMHX:   Past Medical History:   Diagnosis Date    Diabetes mellitus ([address]     Diabetic ulcer of right midfoot associated with type 2 diabetes mellitus, with fat layer exposed ([address] 10/8/2018    Foot ulcer, left ([address]     Right foot ulcer, with fat layer exposed ([address] 10/8/2018     PSHX:  has a past surgical history that includes fracture surgery and Patella surgery (Right). Meds - list of home medications reviewed in electronic chart and confirmed  Allergies: No Known Allergies    FAM HX: family history includes Diabetes in his mother; Other in his father.   Soc HX:   Social History     Socioeconomic History    Marital status:      Spouse name: None    Number of children: None    Years of education: None    Highest education level: None   Occupational History    None   Social Needs    Financial resource strain: None    Food insecurity     Worry: None     Inability: None    Transportation needs     Medical: None     Non-medical: None   Tobacco Use    Smoking status: Former Smoker    Smokeless tobacco: Never Used   Substance and Sexual Activity    Alcohol use: Yes     Comment: Rarely    Drug use: No    Sexual activity: Yes     Partners: Female   Lifestyle    Physical activity     Days per week: None     Minutes per session: None    Stress: None   Relationships    Social connections     Talks on phone: None     Gets together: None     Attends Bahai service: None     Active member of club or organization: None     Attends meetings of clubs or organizations: None     Relationship status: None    Intimate partner violence     Fear of current or ex partner: None     Emotionally abused: None     Physically abused: None     Forced sexual activity: None   Other Topics Concern    None   Social History Narrative    None       ROS: a ten point ROS with pertinent positives and negatives in hpi, otherwise negative. EXAM:  Blood pressure (!) 166/82, pulse 101, temperature 101.4 °F (38.6 °C), temperature source Oral, resp. rate 24, height 6' 2\" (1.88 m), weight 298 lb (135.2 kg), SpO2 96 %. Gen:  awake, alert, cooperative, no apparent distress   EYES:  Lids and lashes normal, pupils equal, round and reactive to light, extra ocular muscles intact, sclera clear, conjunctiva normal  ENT:  Normocephalic, oral pharynx with moist mucus membranes, tonsils without erythema or exudates,  NECK:  Supple, symmetrical, trachea midline, no adenopathy,  LUNGS:  Clear to auscultate bilaterally, no rales ronchi or wheezing noted. CARDIOVASCULAR:  regular rate and rhythm, normal S1 and S2,no murmur/gallop/rub  ABDOMEN: Normal BS, Non tender, non distended, no HSM noted. MUSCULOSKELETAL:  ROM of all extremities grossly wnl  NEUROLOGIC: AOx 3,  Cranial nerves II-XII are grossly intact. Motor is 5 out of 5 bilaterally. Sensory is intact  SKIN:  He has a blister on the left foot which burst open, one under his left foot great toe and the right hallux is tender and swollen and red.         LABS in ER reviewed Xr Foot Right (min 3 Views)    Result Date: 11/7/2020  EXAMINATION: THREE XRAY VIEWS OF THE RIGHT FOOT 11/7/2020 9:44 am COMPARISON: Correlation with MRI and radiographs on 03/15/2019 HISTORY: Infection, evaluate for osteomyelitis. FINDINGS: No acute fracture or dislocation. Again noted is ORIF of the distal fibula. Stable sclerosis of the phalanges and distal metatarsal of the great toe. No new erosive changes definitely seen. Moderate osteoarthrosis of the 1st MTP joint. No soft tissue gas seen. Degenerative spurring along the midfoot. Mild osteoarthrosis of the tibiotalar joint. Achilles and plantar enthesopathy. Mild soft tissue swelling along the forefoot. No radiographic evidence for acute osteomyelitis.    -  Patient Active Problem List   Diagnosis    Right foot ulcer, with fat layer exposed ([address]    Diabetic ulcer of right midfoot associated with type 2 diabetes mellitus, with fat layer exposed ([address]    Essential hypertension    Right foot infection    Type 2 diabetes mellitus with circulatory disorder, without long-term current use of insulin ([address]    Other specified local infections of the skin and subcutaneous tissue    Cellulitis of right lower limb     ______________________________________________________________    Electronically signed by Komal Aguilar MD on 11/7/2020 at 12:04 PM

## 2020-11-07 NOTE — ED TRIAGE NOTES
Arrived ambulatory to room 3 for triage. Tolerated without difficulty. Bed in lowest position. Call light given.  Gowned for exam.

## 2020-11-07 NOTE — ED PROVIDER NOTES
[address]      Pt Name: Jere Hua  MRN: [id number]  [place] 1960  Date of evaluation: 11/7/2020  Provider: Andrew Marte MD    CHIEF COMPLAINT       Chief Complaint   Patient presents with    Wound Check     Right foot is red and swollen. Hot to touch. States started Doxycycline on Thursday. Patient is a diabetic and is having his left foot treated for diabetic ulcers x2. PMS intact. HISTORY OF PRESENT ILLNESS      Jere Hua is a 64 y.o. male who presents to the emergency department  for   Chief Complaint   Patient presents with    Wound Check     Right foot is red and swollen. Hot to touch. States started Doxycycline on Thursday. Patient is a diabetic and is having his left foot treated for diabetic ulcers x2. PMS intact. 77-year-old male with a history of diabetes, diabetic foot infections and diabetic ulcers presents complaining of pain and swelling in his right foot. He is concerned about return of infection. His symptom started yesterday evening. He also endorses feeling subjective fever and chills. Denies any respiratory symptoms. Denies any injury to his right foot. Presents to the emergency department for evaluation. He does follow with a podiatrist, Dr. Dmitri Roa. Not currently on any antibiotics. He said any chest pain or abdominal pain. No nausea or vomiting. He is alert and oriented emergency department answer questions appropriately. He presents febrile to the emergency department. He has been ambulatory but does endorse some pain with ambulation. Identify any exacerbating or alleviating factors. Nursing Notes, Triage Notes & Vital Signs were reviewed. REVIEW OF SYSTEMS    (2-9 systems for level 4, 10 or more for level 5)     Review of Systems   Constitutional: Positive for chills and fever. HENT: Negative for congestion, rhinorrhea and sore throat.     Eyes: Negative foot ulcer, with fat layer exposed ([address]    Diabetic ulcer of right midfoot associated with type 2 diabetes mellitus, with fat layer exposed ([address]    Essential hypertension    Right foot infection    Type 2 diabetes mellitus with circulatory disorder, without long-term current use of insulin (HCC)    Other specified local infections of the skin and subcutaneous tissue         SURGICAL HISTORY       Past Surgical History:   Procedure Laterality Date    FRACTURE SURGERY      left toe and right ankle and right knee    PATELLA SURGERY Right          CURRENT MEDICATIONS       Previous Medications    DOXYCYCLINE HYCLATE (VIBRA-TABS) 100 MG TABLET    Take 100 mg by mouth 2 times daily    GABAPENTIN (NEURONTIN) 400 MG CAPSULE    Take 800 mg by mouth daily. LISINOPRIL (PRINIVIL;ZESTRIL) 10 MG TABLET        METFORMIN (GLUCOPHAGE) 850 MG TABLET    Take 850 mg by mouth    MULTIPLE VITAMINS-MINERALS (THERAPEUTIC MULTIVITAMIN-MINERALS) TABLET    Take 1 tablet by mouth daily    SIMVASTATIN (ZOCOR) 40 MG TABLET    Take 40 mg by mouth    UREA (CARMOL) 40 % CREAM    Apply to affected area BID    VASCEPA 1 G CAPS CAPSULE           ALLERGIES     Patient has no known allergies.     FAMILY HISTORY       Family History   Problem Relation Age of Onset    Diabetes Mother     Other Father           SOCIAL HISTORY       Social History     Socioeconomic History    Marital status:      Spouse name: None    Number of children: None    Years of education: None    Highest education level: None   Occupational History    None   Social Needs    Financial resource strain: None    Food insecurity     Worry: None     Inability: None    Transportation needs     Medical: None     Non-medical: None   Tobacco Use    Smoking status: Former Smoker    Smokeless tobacco: Never Used   Substance and Sexual Activity    Alcohol use: Yes     Comment: Rarely    Drug use: No    Sexual activity: Yes     Partners: Female   Lifestyle    Physical activity     Days per week: None     Minutes per session: None    Stress: None   Relationships    Social connections     Talks on phone: None     Gets together: None     Attends [employer] service: None     Active member of club or organization: None     Attends meetings of clubs or organizations: None     Relationship status: None    Intimate partner violence     Fear of current or ex partner: None     Emotionally abused: None     Physically abused: None     Forced sexual activity: None   Other Topics Concern    None   Social History Narrative    None       SCREENINGS    [place] Coma Scale  Eye Opening: Spontaneous  Best Verbal Response: Oriented  Best Motor Response: Obeys commands  [place] Coma Scale Score: 15          PHYSICAL EXAM    (up to 7 for level 4, 8 or more for level 5)     ED Triage Vitals [11/07/20 0926]   BP Temp Temp Source Pulse Resp SpO2 Height Weight   (!) 166/82 101.4 °F (38.6 °C) Oral 101 24 96 % 6' 2\" (1.88 m) 298 lb (135.2 kg)       Physical Exam  Vitals signs reviewed. Constitutional:       Appearance: He is not ill-appearing or toxic-appearing. HENT:      Head: Normocephalic and atraumatic. Nose: No congestion or rhinorrhea. Mouth/Throat:      Mouth: Mucous membranes are moist.   Eyes:      General:         Right eye: No discharge. Left eye: No discharge. Extraocular Movements: Extraocular movements intact. Pupils: Pupils are equal, round, and reactive to light. Neck:      Musculoskeletal: Normal range of motion and neck supple. No muscular tenderness. Cardiovascular:      Rate and Rhythm: Tachycardia present. Heart sounds: No friction rub. No gallop. Pulmonary:      Effort: No respiratory distress. Breath sounds: No wheezing. Comments: B/l breath sounds  Respirations unlabored  No retractions, no increased work of breathing  Chest:      Chest wall: No tenderness. Abdominal:      Palpations: Abdomen is soft. Tenderness: There is no abdominal tenderness. There is no guarding. Comments: Abdomen soft, non-tender, non-peritoneal  No guarding on abdominal exam   Musculoskeletal:         General: Tenderness present. No deformity. Comments: Right foot tenderness; No defomity noted  2+ dp/pt pulses in b/l lower extremities   Lymphadenopathy:      Cervical: No cervical adenopathy. Skin:     General: Skin is warm. Capillary Refill: Capillary refill takes less than 2 seconds. Findings: Erythema present. Comments: Erythema and mild swelling on right foot; below right great toe an on dorsum of right foot  No open lesions or sores noted on right foot; no drainage   Neurological:      General: No focal deficit present. Mental Status: He is alert and oriented to person, place, and time. DIAGNOSTIC RESULTS     Labs Reviewed   COMPREHENSIVE METABOLIC PANEL W/ REFLEX TO MG FOR LOW K - Abnormal; Notable for the following components:       Result Value    Sodium 132 (*)     Chloride 96 (*)     Glucose 192 (*)     All other components within normal limits   CBC WITH AUTO DIFFERENTIAL - Abnormal; Notable for the following components:    RBC 4.12 (*)     Hemoglobin 12.2 (*)     Hematocrit 36.2 (*)     Platelets 555 (*)     Segs Relative 73.9 (*)     Lymphocytes % 10.9 (*)     Monocytes % 14.6 (*)     Immature Neutrophil % 0.5 (*)     All other components within normal limits   CULTURE, BLOOD 1   CULTURE, BLOOD 2          RADIOLOGY:     Non-plain film images such as CT, Ultrasound and MRI are read by the radiologist. Plain radiographic images are visualized and preliminarily interpreted by the emergency physician. Interpretation per the Radiologist below, if available at the time of this note:    XR FOOT RIGHT (MIN 3 VIEWS)   Final Result   No radiographic evidence for acute osteomyelitis.                ED BEDSIDE ULTRASOUND:   Performed by ED Physician Rogelio Floyd MD       LABS:  Labs Reviewed   COMPREHENSIVE METABOLIC PANEL W/ REFLEX TO MG FOR LOW K - Abnormal; Notable for the following components:       Result Value    Sodium 132 (*)     Chloride 96 (*)     Glucose 192 (*)     All other components within normal limits   CBC WITH AUTO DIFFERENTIAL - Abnormal; Notable for the following components:    RBC 4.12 (*)     Hemoglobin 12.2 (*)     Hematocrit 36.2 (*)     Platelets 694 (*)     Segs Relative 73.9 (*)     Lymphocytes % 10.9 (*)     Monocytes % 14.6 (*)     Immature Neutrophil % 0.5 (*)     All other components within normal limits   CULTURE, BLOOD 1   CULTURE, BLOOD 2       All other labs were within normal range or not returned as of this dictation. EMERGENCY DEPARTMENT COURSE and DIFFERENTIAL DIAGNOSIS/MDM:   Vitals:    Vitals:    11/07/20 0926   BP: (!) 166/82   Pulse: 101   Resp: 24   Temp: 101.4 °F (38.6 °C)   TempSrc: Oral   SpO2: 96%   Weight: 298 lb (135.2 kg)   Height: 6' 2\" (1.88 m)           MDM  Number of Diagnoses or Management Options  Diabetic foot infection [facility]):   Diagnosis management comments: 82-year-old male with history of diabetic ulcers, diabetic foot infections and diabetes presents complaining of right foot pain and swelling. Symptoms started yesterday evening. He has no open sores or lesions on the right foot. No draining. He is concerned about return of infection. Does not do subjective fevers and chills. Denies any injury to the right foot. Urgency department, he is febrile mildly tachycardic upon presentation. On exam he does have some erythema and swelling to right foot below the right great toe and on dorsum of foot. Findings do seem consistent with cellulitis. Patient does follow with a podiatrist Dr. Jag Taylor. Labs and x-rays are obtained. Cultures are pending at this time. Review of records, he is not had    The myelitis of the right foot but has had cellulitis is also fluid collections. He is treated with Tylenol for the fever.   His symptoms do seem consistent with a diabetic foot infection. I will start IV antibiotics. I will start vancomycin and Zosyn. His x-ray is overall nonacute. Leukocytosis on his labs. He will be admitted for IV antibiotics. He is given a plan of care. Amount and/or Complexity of Data Reviewed  Clinical lab tests: reviewed  Tests in the radiology section of CPT®: reviewed        CONSULTS:  None    PROCEDURES:  None performed unless otherwise noted below     Procedures        FINAL IMPRESSION      1. Diabetic foot infection [facility])          DISPOSITION/PLAN   DISPOSITION Decision To Admit 11/07/2020 11:56:37 AM      PATIENT REFERRED TO:  No follow-up provider specified. DISCHARGE MEDICATIONS:  New Prescriptions    No medications on file       ED Provider Disposition Time  DISPOSITION Decision To Admit 11/07/2020 11:56:37 AM      Appropriate personal protective equipment was worn during the patient's evaluation. These included surgical, eye protection, surgical mask or in 95 respirator and gloves. The patient was also placed in a surgical mask for the prevention of possible spread of respiratory viral illnesses. The Patient was instructed to read the package inserts with any medication that was prescribed. Major potential reactions and medication interactions were discussed. The Patient understands that there are numerous possible adverse reactions not covered. The patient was also instructed to arrange follow-up with his or her primary care provider for review of any pending labwork or incidental findings on any radiology results that were obtained. All efforts were made to discuss any incidental findings that require further monitoring. Controlled Substances Monitoring:     No flowsheet data found.     (Please note that portions of this note were completed with a voice recognition program.  Efforts were made to edit the dictations but occasionally words are mis-transcribed.)    Hunter Morales Gume Haddad MD (electronically signed)  Attending Emergency Physician           Rupali Irene MD  11/07/20 8861

## 2020-11-08 LAB
ANION GAP SERPL CALCULATED.3IONS-SCNC: 12 MMOL/L (ref 4–16)
BUN BLDV-MCNC: 17 MG/DL (ref 6–23)
CALCIUM SERPL-MCNC: 8.5 MG/DL (ref 8.3–10.6)
CHLORIDE BLD-SCNC: 98 MMOL/L (ref 99–110)
CO2: 24 MMOL/L (ref 21–32)
CREAT SERPL-MCNC: 1.1 MG/DL (ref 0.9–1.3)
GFR AFRICAN AMERICAN: >60 ML/MIN/1.73M2
GFR NON-AFRICAN AMERICAN: >60 ML/MIN/1.73M2
GLUCOSE BLD-MCNC: 147 MG/DL (ref 70–99)
GLUCOSE BLD-MCNC: 152 MG/DL (ref 70–99)
GLUCOSE BLD-MCNC: 154 MG/DL (ref 70–99)
GLUCOSE BLD-MCNC: 162 MG/DL (ref 70–99)
GLUCOSE BLD-MCNC: 169 MG/DL (ref 70–99)
POTASSIUM SERPL-SCNC: 3.7 MMOL/L (ref 3.5–5.1)
SODIUM BLD-SCNC: 134 MMOL/L (ref 135–145)
URIC ACID: 5.6 MG/DL (ref 3.5–7.2)

## 2020-11-08 PROCEDURE — 2580000003 HC RX 258: Performed by: INTERNAL MEDICINE

## 2020-11-08 PROCEDURE — 6360000002 HC RX W HCPCS: Performed by: INTERNAL MEDICINE

## 2020-11-08 PROCEDURE — G0008 ADMIN INFLUENZA VIRUS VAC: HCPCS | Performed by: INTERNAL MEDICINE

## 2020-11-08 PROCEDURE — 96372 THER/PROPH/DIAG INJ SC/IM: CPT

## 2020-11-08 PROCEDURE — 90686 IIV4 VACC NO PRSV 0.5 ML IM: CPT | Performed by: INTERNAL MEDICINE

## 2020-11-08 PROCEDURE — 80048 BASIC METABOLIC PNL TOTAL CA: CPT

## 2020-11-08 PROCEDURE — G0378 HOSPITAL OBSERVATION PER HR: HCPCS

## 2020-11-08 PROCEDURE — 82962 GLUCOSE BLOOD TEST: CPT

## 2020-11-08 PROCEDURE — 80202 ASSAY OF VANCOMYCIN: CPT

## 2020-11-08 PROCEDURE — 6370000000 HC RX 637 (ALT 250 FOR IP): Performed by: INTERNAL MEDICINE

## 2020-11-08 PROCEDURE — 36415 COLL VENOUS BLD VENIPUNCTURE: CPT

## 2020-11-08 PROCEDURE — 96376 TX/PRO/DX INJ SAME DRUG ADON: CPT

## 2020-11-08 PROCEDURE — 96366 THER/PROPH/DIAG IV INF ADDON: CPT

## 2020-11-08 PROCEDURE — 6370000000 HC RX 637 (ALT 250 FOR IP): Performed by: NURSE PRACTITIONER

## 2020-11-08 PROCEDURE — 1200000000 HC SEMI PRIVATE

## 2020-11-08 RX ORDER — UREA 40 %
CREAM (GRAM) TOPICAL PRN
Status: DISCONTINUED | OUTPATIENT
Start: 2020-11-08 | End: 2020-11-09 | Stop reason: HOSPADM

## 2020-11-08 RX ORDER — PIOGLITAZONEHYDROCHLORIDE 15 MG/1
15 TABLET ORAL DAILY
Status: DISCONTINUED | OUTPATIENT
Start: 2020-11-08 | End: 2020-11-09 | Stop reason: HOSPADM

## 2020-11-08 RX ADMIN — GABAPENTIN 800 MG: 400 CAPSULE ORAL at 20:29

## 2020-11-08 RX ADMIN — VANCOMYCIN HYDROCHLORIDE 1500 MG: 1 INJECTION, POWDER, LYOPHILIZED, FOR SOLUTION INTRAVENOUS at 23:11

## 2020-11-08 RX ADMIN — PIPERACILLIN AND TAZOBACTAM 3.38 G: 3; .375 INJECTION, POWDER, LYOPHILIZED, FOR SOLUTION INTRAVENOUS at 18:04

## 2020-11-08 RX ADMIN — TRAMADOL HYDROCHLORIDE 100 MG: 50 TABLET, FILM COATED ORAL at 01:51

## 2020-11-08 RX ADMIN — ATORVASTATIN CALCIUM 10 MG: 10 TABLET, FILM COATED ORAL at 09:26

## 2020-11-08 RX ADMIN — PIPERACILLIN AND TAZOBACTAM 3.38 G: 3; .375 INJECTION, POWDER, LYOPHILIZED, FOR SOLUTION INTRAVENOUS at 01:39

## 2020-11-08 RX ADMIN — SODIUM CHLORIDE, PRESERVATIVE FREE 10 ML: 5 INJECTION INTRAVENOUS at 20:29

## 2020-11-08 RX ADMIN — INSULIN LISPRO 1 UNITS: 100 INJECTION, SOLUTION INTRAVENOUS; SUBCUTANEOUS at 20:32

## 2020-11-08 RX ADMIN — MORPHINE SULFATE 1 MG: 2 INJECTION, SOLUTION INTRAMUSCULAR; INTRAVENOUS at 05:48

## 2020-11-08 RX ADMIN — VANCOMYCIN HYDROCHLORIDE 1500 MG: 1 INJECTION, POWDER, LYOPHILIZED, FOR SOLUTION INTRAVENOUS at 09:39

## 2020-11-08 RX ADMIN — METFORMIN HYDROCHLORIDE 850 MG: 850 TABLET ORAL at 13:12

## 2020-11-08 RX ADMIN — PIPERACILLIN AND TAZOBACTAM 3.38 G: 3; .375 INJECTION, POWDER, LYOPHILIZED, FOR SOLUTION INTRAVENOUS at 12:14

## 2020-11-08 RX ADMIN — MORPHINE SULFATE 1 MG: 2 INJECTION, SOLUTION INTRAMUSCULAR; INTRAVENOUS at 19:00

## 2020-11-08 RX ADMIN — ACETAMINOPHEN 650 MG: 325 TABLET ORAL at 20:31

## 2020-11-08 RX ADMIN — ENOXAPARIN SODIUM 40 MG: 40 INJECTION, SOLUTION INTRAVENOUS; SUBCUTANEOUS at 09:26

## 2020-11-08 RX ADMIN — PIOGLITAZONE 15 MG: 15 TABLET ORAL at 12:57

## 2020-11-08 RX ADMIN — OMEGA-3-ACID ETHYL ESTERS 1 G: 1 CAPSULE, LIQUID FILLED ORAL at 09:26

## 2020-11-08 RX ADMIN — TRAMADOL HYDROCHLORIDE 100 MG: 50 TABLET, FILM COATED ORAL at 18:12

## 2020-11-08 RX ADMIN — TRAMADOL HYDROCHLORIDE 50 MG: 50 TABLET, FILM COATED ORAL at 09:50

## 2020-11-08 RX ADMIN — INFLUENZA A VIRUS A/VICTORIA/2454/2019 IVR-207 (H1N1) ANTIGEN (PROPIOLACTONE INACTIVATED), INFLUENZA A VIRUS A/HONG KONG/2671/2019 IVR-208 (H3N2) ANTIGEN (PROPIOLACTONE INACTIVATED), INFLUENZA B VIRUS B/VICTORIA/705/2018 BVR-11 ANTIGEN (PROPIOLACTONE INACTIVATED), INFLUENZA B VIRUS B/PHUKET/3073/2013 BVR-1B ANTIGEN (PROPIOLACTONE INACTIVATED) 0.5 ML: 15; 15; 15; 15 INJECTION, SUSPENSION INTRAMUSCULAR at 09:24

## 2020-11-08 RX ADMIN — SODIUM CHLORIDE, PRESERVATIVE FREE 10 ML: 5 INJECTION INTRAVENOUS at 09:39

## 2020-11-08 RX ADMIN — COLCHICINE 0.6 MG: 0.6 TABLET, FILM COATED ORAL at 09:26

## 2020-11-08 RX ADMIN — MORPHINE SULFATE 1 MG: 2 INJECTION, SOLUTION INTRAMUSCULAR; INTRAVENOUS at 23:12

## 2020-11-08 ASSESSMENT — PAIN SCALES - GENERAL
PAINLEVEL_OUTOF10: 7
PAINLEVEL_OUTOF10: 4
PAINLEVEL_OUTOF10: 6
PAINLEVEL_OUTOF10: 7
PAINLEVEL_OUTOF10: 7
PAINLEVEL_OUTOF10: 4
PAINLEVEL_OUTOF10: 7
PAINLEVEL_OUTOF10: 6
PAINLEVEL_OUTOF10: 5
PAINLEVEL_OUTOF10: 7

## 2020-11-08 ASSESSMENT — PAIN DESCRIPTION - DESCRIPTORS
DESCRIPTORS: ACHING
DESCRIPTORS: ACHING

## 2020-11-08 ASSESSMENT — PAIN DESCRIPTION - ORIENTATION
ORIENTATION: RIGHT
ORIENTATION: RIGHT

## 2020-11-08 ASSESSMENT — PAIN DESCRIPTION - LOCATION
LOCATION: FOOT
LOCATION: FOOT

## 2020-11-08 ASSESSMENT — PAIN DESCRIPTION - PAIN TYPE
TYPE: ACUTE PAIN
TYPE: ACUTE PAIN

## 2020-11-08 NOTE — PLAN OF CARE
Problem: Pain:  Goal: Pain level will decrease  Description: Pain level will decrease  Outcome: Ongoing     Problem: Pain:  Goal: Control of acute pain  Description: Control of acute pain  Outcome: Ongoing     Problem: Pain:  Goal: Control of chronic pain  Description: Control of chronic pain  Outcome: Ongoing

## 2020-11-08 NOTE — PROGRESS NOTES
Hospitalist Progress Note       Tammi Calderon M.D.  11/8/2020 7:20 AM  Admit Date: 11/7/2020    PCP: Clara Singer MD     Assessment and Plan:     1. Cellulitis failed outpatient abx; with DM2 ulcer  - iv vanc+zosyn  - blood cultures are ordered  - wound care consulted  - prn tramadol     2. ?goutright hallux  - will try colchicine      3. DM2 NIDDM  - is on metformin  - poct prn/daily     4. htn  - pain may cause bp to rise  - hold lisinopril if bp lo      Patient Active Problem List:     Right foot ulcer, with fat layer exposed ([address]     Diabetic ulcer of right midfoot associated with type 2 diabetes mellitus, with fat layer exposed ([address]     Essential hypertension     Right foot infection     Type 2 diabetes mellitus with circulatory disorder, without long-term current use of insulin ([address]     Other specified local infections of the skin and subcutaneous tissue     Cellulitis of right lower limb      Subjective:     Chief Complaint   Patient presents with    Wound Check     Right foot is red and swollen. Hot to touch. States started Doxycycline on Thursday. Patient is a diabetic and is having his left foot treated for diabetic ulcers x2. PMS intact. F/U:  Interval History: feels that swelling has gotten better    Objective:   No intake or output data in the 24 hours ending 11/08/20 0720   Vitals:   Vitals:    11/07/20 2155   BP:    Pulse:    Resp:    Temp: 98.8 °F (37.1 °C)   SpO2:      Physical Exam:  Gen:  awake, alert, cooperative, no apparent distress, EYES:  Lids and lashes normal, pupils equal, round and reactive to light, extra ocular muscles intact, sclera clear, conjunctiva normal  ENT:  Normocephalic, oral pharynx with moist mucus membranes, tonsils without erythema or exudates,  NECK:  Supple, symmetrical, trachea midline, no adenopathy,  LUNGS:  Clear to auscultate bilaterally, no rales ronchi or wheezing noted.   CARDIOVASCULAR:  regular rate and rhythm, normal S1 and S2, no S3 or S4, and no murmur noted  ABDOMEN: Normal BS, Non tender, non distended, no HSM noted. MUSCULOSKELETAL:  ROM of all extremities grossly wnl  NEUROLOGIC: AOx 3,  Cranial nerves II-XII are grossly intact. Motor is 5 out of 5 bilaterally. Sensory is intact  SKIN:  Swelling is still present, area of erythema has receded from line of demarcation    Xr Foot Right (min 3 Views)    Result Date: 11/7/2020  EXAMINATION: THREE XRAY VIEWS OF THE RIGHT FOOT 11/7/2020 9:44 am COMPARISON: Correlation with MRI and radiographs on 03/15/2019 HISTORY: Infection, evaluate for osteomyelitis. FINDINGS: No acute fracture or dislocation. Again noted is ORIF of the distal fibula. Stable sclerosis of the phalanges and distal metatarsal of the great toe. No new erosive changes definitely seen. Moderate osteoarthrosis of the 1st MTP joint. No soft tissue gas seen. Degenerative spurring along the midfoot. Mild osteoarthrosis of the tibiotalar joint. Achilles and plantar enthesopathy. Mild soft tissue swelling along the forefoot. No radiographic evidence for acute osteomyelitis. -    DATA:    CBC   Recent Labs     11/07/20  1115   WBC 10.2   HGB 12.2*   HCT 36.2*   *      BMP   Recent Labs     11/07/20  1115 11/08/20  0545   * 134*   K 3.9 3.7   CL 96* 98*   CO2 26 24   BUN 15 17   CREATININE 1.0 1.1     LFT'S   Recent Labs     11/07/20  1115   AST 18   ALT 15   BILITOT 0.8   ALKPHOS 52     COAG No results for input(s): INR in the last 72 hours. CARDIAC ENZYMES  No results for input(s): CKTOTAL, CKMB, CKMBINDEX, TROPONINI in the last 72 hours.   U/A:  No results found for: Cherylene Fanti, Ray Perez, Isha Fair, Manuel Lara MD  [facility] Hospitalist

## 2020-11-09 VITALS
TEMPERATURE: 97.8 F | DIASTOLIC BLOOD PRESSURE: 62 MMHG | RESPIRATION RATE: 16 BRPM | BODY MASS INDEX: 37.15 KG/M2 | HEIGHT: 74 IN | SYSTOLIC BLOOD PRESSURE: 136 MMHG | WEIGHT: 289.5 LBS | OXYGEN SATURATION: 94 % | HEART RATE: 73 BPM

## 2020-11-09 LAB
ANION GAP SERPL CALCULATED.3IONS-SCNC: 8 MMOL/L (ref 4–16)
BUN BLDV-MCNC: 13 MG/DL (ref 6–23)
CALCIUM SERPL-MCNC: 8.5 MG/DL (ref 8.3–10.6)
CHLORIDE BLD-SCNC: 98 MMOL/L (ref 99–110)
CO2: 27 MMOL/L (ref 21–32)
CREAT SERPL-MCNC: 0.9 MG/DL (ref 0.9–1.3)
DOSE AMOUNT: ABNORMAL
DOSE TIME: ABNORMAL
GFR AFRICAN AMERICAN: >60 ML/MIN/1.73M2
GFR NON-AFRICAN AMERICAN: >60 ML/MIN/1.73M2
GLUCOSE BLD-MCNC: 166 MG/DL (ref 70–99)
GLUCOSE BLD-MCNC: 198 MG/DL (ref 70–99)
POTASSIUM SERPL-SCNC: 3.9 MMOL/L (ref 3.5–5.1)
SODIUM BLD-SCNC: 133 MMOL/L (ref 135–145)
VANCOMYCIN TROUGH: 8.3 UG/ML (ref 10–20)

## 2020-11-09 PROCEDURE — 2580000003 HC RX 258: Performed by: INTERNAL MEDICINE

## 2020-11-09 PROCEDURE — 82962 GLUCOSE BLOOD TEST: CPT

## 2020-11-09 PROCEDURE — 96372 THER/PROPH/DIAG INJ SC/IM: CPT

## 2020-11-09 PROCEDURE — G0378 HOSPITAL OBSERVATION PER HR: HCPCS

## 2020-11-09 PROCEDURE — 6360000002 HC RX W HCPCS: Performed by: INTERNAL MEDICINE

## 2020-11-09 PROCEDURE — 36415 COLL VENOUS BLD VENIPUNCTURE: CPT

## 2020-11-09 PROCEDURE — 99211 OFF/OP EST MAY X REQ PHY/QHP: CPT

## 2020-11-09 PROCEDURE — 80048 BASIC METABOLIC PNL TOTAL CA: CPT

## 2020-11-09 PROCEDURE — 6370000000 HC RX 637 (ALT 250 FOR IP): Performed by: INTERNAL MEDICINE

## 2020-11-09 PROCEDURE — 96376 TX/PRO/DX INJ SAME DRUG ADON: CPT

## 2020-11-09 PROCEDURE — 96366 THER/PROPH/DIAG IV INF ADDON: CPT

## 2020-11-09 RX ORDER — CEPHALEXIN 500 MG/1
500 CAPSULE ORAL EVERY 12 HOURS SCHEDULED
Status: DISCONTINUED | OUTPATIENT
Start: 2020-11-09 | End: 2020-11-09 | Stop reason: HOSPADM

## 2020-11-09 RX ORDER — CLINDAMYCIN HYDROCHLORIDE 300 MG/1
300 CAPSULE ORAL 3 TIMES DAILY
Qty: 15 CAPSULE | Refills: 0 | Status: SHIPPED | OUTPATIENT
Start: 2020-11-09 | End: 2020-11-14

## 2020-11-09 RX ORDER — CEPHALEXIN 500 MG/1
500 CAPSULE ORAL EVERY 12 HOURS SCHEDULED
Qty: 10 CAPSULE | Refills: 0 | Status: SHIPPED | OUTPATIENT
Start: 2020-11-09 | End: 2020-11-14

## 2020-11-09 RX ORDER — CLINDAMYCIN HYDROCHLORIDE 150 MG/1
300 CAPSULE ORAL EVERY 6 HOURS SCHEDULED
Status: DISCONTINUED | OUTPATIENT
Start: 2020-11-09 | End: 2020-11-09 | Stop reason: HOSPADM

## 2020-11-09 RX ORDER — TRAMADOL HYDROCHLORIDE 50 MG/1
50 TABLET ORAL EVERY 6 HOURS PRN
Qty: 12 TABLET | Refills: 0 | Status: SHIPPED | OUTPATIENT
Start: 2020-11-09 | End: 2020-11-12

## 2020-11-09 RX ADMIN — OMEGA-3-ACID ETHYL ESTERS 1 G: 1 CAPSULE, LIQUID FILLED ORAL at 08:50

## 2020-11-09 RX ADMIN — TRAMADOL HYDROCHLORIDE 100 MG: 50 TABLET, FILM COATED ORAL at 07:22

## 2020-11-09 RX ADMIN — METFORMIN HYDROCHLORIDE 850 MG: 850 TABLET ORAL at 08:51

## 2020-11-09 RX ADMIN — ENOXAPARIN SODIUM 40 MG: 40 INJECTION, SOLUTION INTRAVENOUS; SUBCUTANEOUS at 08:51

## 2020-11-09 RX ADMIN — VANCOMYCIN HYDROCHLORIDE 1500 MG: 1 INJECTION, POWDER, LYOPHILIZED, FOR SOLUTION INTRAVENOUS at 09:01

## 2020-11-09 RX ADMIN — ATORVASTATIN CALCIUM 10 MG: 10 TABLET, FILM COATED ORAL at 08:51

## 2020-11-09 RX ADMIN — PIPERACILLIN AND TAZOBACTAM 3.38 G: 3; .375 INJECTION, POWDER, LYOPHILIZED, FOR SOLUTION INTRAVENOUS at 02:00

## 2020-11-09 RX ADMIN — PIOGLITAZONE 15 MG: 15 TABLET ORAL at 08:50

## 2020-11-09 RX ADMIN — COLCHICINE 0.6 MG: 0.6 TABLET, FILM COATED ORAL at 08:50

## 2020-11-09 RX ADMIN — CEPHALEXIN 500 MG: 500 CAPSULE ORAL at 11:32

## 2020-11-09 RX ADMIN — CLINDAMYCIN HYDROCHLORIDE 300 MG: 150 CAPSULE ORAL at 11:32

## 2020-11-09 RX ADMIN — SODIUM CHLORIDE, PRESERVATIVE FREE 10 ML: 5 INJECTION INTRAVENOUS at 08:51

## 2020-11-09 RX ADMIN — MORPHINE SULFATE 1 MG: 2 INJECTION, SOLUTION INTRAMUSCULAR; INTRAVENOUS at 09:11

## 2020-11-09 ASSESSMENT — PAIN SCALES - GENERAL
PAINLEVEL_OUTOF10: 5
PAINLEVEL_OUTOF10: 7

## 2020-11-09 NOTE — DISCHARGE SUMMARY
Levar Harden 1960 [id number]  PCP:  Gary Rainey MD    Admit date: 11/7/2020  Admitting Physician: Mckenna Nichols MD    Discharge date: 11/9/2020 Discharge Physician: Mckenna Nichols MD      Reason for admission:   Chief Complaint   Patient presents with    Wound Check     Right foot is red and swollen. Hot to touch. States started Doxycycline on Thursday. Patient is a diabetic and is having his left foot treated for diabetic ulcers x2. PMS intact. Present on Admission:   Cellulitis of right lower limb       Discharge Diagnoses Include:  1. Cellulitis failed outpatient abx; with DM2 ulcer  -dc on clinda + keflex for coverage of potential staph + strep; 5 more days of abx     2. ?goutright hallux  - will try colchicine ; resolved     3. DM2 NIDDM  - is on metformin  - poct prn/daily     4. htn  - pain may cause bp to rise  - hold lisinopril if bp lo    Hospital Course[de-identified]   Cellulitis improved with 2 days of IV abx therpay continue po as op. Pt was personally examined by me on the day of discharge with the following findings: feeling better  Vitals:    11/09/20 0743   BP: 136/62   Pulse: 73   Resp: 16   Temp: 97.8 °F (36.6 °C)   SpO2: 94%     Gen: ao and  Heent: ncat  Lung: ctabl   Car: nl s1s2  Abd: soft ntnd  Ext rom wnl  Skin: erythema improved, swelling improving      Patient Instructions:   [place], [id number] [facility] Medication Instructions NBX:[id number]    Printed on:11/09/20 1017   Medication Information                      cephALEXin (KEFLEX) 500 MG capsule  Take 1 capsule by mouth every 12 hours for 10 doses             clindamycin (CLEOCIN) 300 MG capsule  Take 1 capsule by mouth 3 times daily for 5 days             gabapentin (NEURONTIN) 400 MG capsule  Take 800 mg by mouth daily.               lisinopril (PRINIVIL;ZESTRIL) 10 MG tablet               metFORMIN (GLUCOPHAGE) 850 MG tablet  Take 850 mg by mouth             Multiple Vitamins-Minerals (THERAPEUTIC MULTIVITAMIN-MINERALS) tablet  Take 1 tablet by mouth daily             simvastatin (ZOCOR) 40 MG tablet  Take 40 mg by mouth             traMADol (ULTRAM) 50 MG tablet  Take 1 tablet by mouth every 6 hours as needed for Pain for up to 3 days. Urea (CARMOL) 40 % cream  Apply to affected area BID             VASCEPA 1 g CAPS capsule                    Code Status: Full Code     Consults: none  IP CONSULT TO PHARMACY    Diet: diabetic diet    Activity: activity as tolerated   Work:    Discharged Condition: stable    Prognosis: Fair    Disposition: home         Discharge Physician Signed: Arlene Rubalcava M.D. The patient was seen and examined on day of discharge and this discharge summary is in conjunction with any daily progress note from day of discharge.   Time spent on discharge in the examination, evaluation, counseling and review of medications and discharge plan: >30 minutes

## 2020-11-09 NOTE — CONSULTS
[facility] Continence Nurse  Consult Note               Subjective:     Reason for  Evaluation and Assessment: wound care eval lt foot wounds- diabetic. Angelina Cotton is a 64 y.o. male referred by:   [x] Physician  [] Nursing  [] Other:     Wound Identification:  Wound Type: diabetic  Contributing Factors: diabetes and chronic pressure        PAST MEDICAL HISTORY        Diagnosis Date    Diabetes mellitus ([address]     Diabetic ulcer of right midfoot associated with type 2 diabetes mellitus, with fat layer exposed ([address] 10/8/2018    Foot ulcer, left ([address]     Right foot ulcer, with fat layer exposed ([address] 10/8/2018       PAST SURGICAL HISTORY    Past Surgical History:   Procedure Laterality Date    FRACTURE SURGERY      left toe and right ankle and right knee    PATELLA SURGERY Right        FAMILY HISTORY    Family History   Problem Relation Age of Onset    Diabetes Mother     Other Father        SOCIAL HISTORY    Social History     Tobacco Use    Smoking status: Former Smoker    Smokeless tobacco: Never Used   Substance Use Topics    Alcohol use: Yes     Comment: Rarely    Drug use: No       ALLERGIES    No Known Allergies    MEDICATIONS    No current facility-administered medications on file prior to encounter. Current Outpatient Medications on File Prior to Encounter   Medication Sig Dispense Refill    Urea (CARMOL) 40 % cream Apply to affected area  g 2    VASCEPA 1 g CAPS capsule       lisinopril (PRINIVIL;ZESTRIL) 10 MG tablet       metFORMIN (GLUCOPHAGE) 850 MG tablet Take 850 mg by mouth      simvastatin (ZOCOR) 40 MG tablet Take 40 mg by mouth      Multiple Vitamins-Minerals (THERAPEUTIC MULTIVITAMIN-MINERALS) tablet Take 1 tablet by mouth daily      gabapentin (NEURONTIN) 400 MG capsule Take 800 mg by mouth daily.             Objective:      /62   Pulse 73   Temp 97.8 °F (36.6 °C)   Resp 16   Ht 6' 2\" (1.88 m)   Wt 289 lb 8 oz (131.3 kg) SpO2 94%   BMI 37.17 kg/m²   Polo Risk Score: Polo Scale Score: 21    LABS    CBC:   Lab Results   Component Value Date    WBC 10.2 11/07/2020    RBC 4.12 11/07/2020    HGB 12.2 11/07/2020    HCT 36.2 11/07/2020    MCV 87.9 11/07/2020    MCH 29.6 11/07/2020    MCHC 33.7 11/07/2020    RDW 12.7 11/07/2020     11/07/2020    MPV 10.3 11/07/2020     CMP:    Lab Results   Component Value Date     11/09/2020    K 3.9 11/09/2020    CL 98 11/09/2020    CO2 27 11/09/2020    BUN 13 11/09/2020    CREATININE 0.9 11/09/2020    GFRAA >60 11/09/2020    LABGLOM >60 11/09/2020    GLUCOSE 166 11/09/2020    PROT 6.8 11/07/2020    LABALBU 3.7 11/07/2020    CALCIUM 8.5 11/09/2020    BILITOT 0.8 11/07/2020    ALKPHOS 52 11/07/2020    AST 18 11/07/2020    ALT 15 11/07/2020     Albumin:    Lab Results   Component Value Date    LABALBU 3.7 11/07/2020     PT/INR:  No results found for: PROTIME, INR  HgBA1c:    Lab Results   Component Value Date    LABA1C 8.7 03/15/2019         Assessment:     Patient Active Problem List   Diagnosis    Right foot ulcer, with fat layer exposed ([address]    Diabetic ulcer of right midfoot associated with type 2 diabetes mellitus, with fat layer exposed ([address]    Essential hypertension    Right foot infection    Type 2 diabetes mellitus with circulatory disorder, without long-term current use of insulin ([address]    Other specified local infections of the skin and subcutaneous tissue    Cellulitis of right lower limb       Measurements:  Wound 11/07/20 Foot Left;Plantar Scabed area 1.5cmx1.5 (Active)   Wound Etiology Diabetic 11/09/20 0941   Dressing Status New dressing applied 11/09/20 0941   Wound Cleansed Cleansed with saline 11/09/20 0941   Dressing/Treatment Open to air 11/09/20 0913   Wound Length (cm) 0.9 cm 11/09/20 0941   Wound Width (cm) 0.6 cm 11/09/20 0941   Wound Depth (cm) 0.1 cm 11/09/20 0941   Wound Surface Area (cm^2) 0.54 cm^2 11/09/20 0941   Wound Volume (cm^3) 0.05 cm^3 11/09/20 0941   Distance Tunneling (cm) 0 cm 11/09/20 0941   Tunneling Position ___ O'Clock 0 11/09/20 0941   Undermining Starts ___ O'Clock 0 11/09/20 0941   Undermining Ends___ O'Clock 0 11/09/20 0941   Undermining Maxium Distance (cm) 0 11/09/20 0941   Wound Assessment Pink/red 11/09/20 0941   Drainage Amount Small 11/09/20 0941   Drainage Description Serosanguinous 11/09/20 0941   Odor None 11/09/20 0941   Cathie-wound Assessment Dry/flaky 11/09/20 0941   Margins Defined edges 11/09/20 0941   Wound Thickness Description not for Pressure Injury Full thickness 11/09/20 0941   Number of days: 2       Wound 11/07/20 Foot Left; Outer;Proximal Scabbed area 1.4X1.5 cm (Active)   Wound Etiology Diabetic 11/09/20 0941   Dressing Status New dressing applied 11/09/20 0941   Wound Cleansed Cleansed with saline 11/09/20 0941   Dressing/Treatment Open to air 11/09/20 0913   Wound Length (cm) 0.5 cm 11/09/20 0941   Wound Width (cm) 0.5 cm 11/09/20 0941   Wound Depth (cm) 0.1 cm 11/09/20 0941   Wound Surface Area (cm^2) 0.25 cm^2 11/09/20 0941   Wound Volume (cm^3) 0.02 cm^3 11/09/20 0941   Distance Tunneling (cm) 0 cm 11/09/20 0941   Tunneling Position ___ O'Clock 0 11/09/20 0941   Undermining Starts ___ O'Clock 0 11/09/20 0941   Undermining Ends___ O'Clock 0 11/09/20 0941   Undermining Maxium Distance (cm) 0 11/09/20 0941   Wound Assessment Pink/red 11/09/20 0941   Drainage Amount Small 11/09/20 0941   Drainage Description Serosanguinous 11/09/20 0941   Odor None 11/09/20 0941   Cathie-wound Assessment Dry/flaky 11/09/20 0941   Margins Defined edges 11/09/20 0941   Wound Thickness Description not for Pressure Injury Full thickness 11/09/20 0941   Number of days: 2       Response to treatment:  Well tolerated by patient. Pain Assessment:  Severity:  0  Quality of pain: none  Wound Pain Timing/Severity:   Premedicated: no    Plan:     Plan of Care: Wound 11/07/20 Foot Left;Plantar Scabed area 1.5cmx1. 5-Dressing/Treatment: (puracol optifoam border)  Wound 11/07/20 Foot Left; Outer;Proximal Scabbed area 1.4X1.5 cm-Dressing/Treatment: (puracol optifoam border)     Pt in bed on phone agreeable to wound care. Pt verbalized he is hoping he can go home today. Wound care eval for left foot wounds plantar and lateral diabetic. Pt sees dr Lane Sanchez and uses enrrique. Wounds cleansed with NS measured and pictured dressings as above. Pt is generally not at risk for skin breakdown AEB donna score. Observe donna orders. Specialty Bed Required : no  [] Low Air Loss   [] Pressure Redistribution  [] Fluid Immersion  [] Bariatric  [] Total Pressure Relief  [] Other:     Discharge Plan:  Placement for patient upon discharge: today  Hospice Care: no  Patient appropriate for Outpatient [address] [facility] [address]: pt goes to dr Lane Sanchez- podiatry. Patient/Caregiver Teaching:  Level of patient/caregiver understanding able to:  Pt verbalized  understanding of wound care. Electronically signed by Linette Saunders.  [facility],  on 11/9/2020 at 12:50 PM

## 2020-11-10 LAB
CULTURE: ABNORMAL
CULTURE: ABNORMAL
Lab: ABNORMAL
SPECIMEN: ABNORMAL

## 2020-11-13 LAB
CULTURE: NORMAL
Lab: NORMAL
SPECIMEN: NORMAL